# Patient Record
Sex: FEMALE | Race: WHITE | NOT HISPANIC OR LATINO | Employment: FULL TIME | URBAN - METROPOLITAN AREA
[De-identification: names, ages, dates, MRNs, and addresses within clinical notes are randomized per-mention and may not be internally consistent; named-entity substitution may affect disease eponyms.]

---

## 2017-05-27 ENCOUNTER — COMMUNICATION - HEALTHEAST (OUTPATIENT)
Dept: SCHEDULING | Facility: CLINIC | Age: 37
End: 2017-05-27

## 2017-06-08 ENCOUNTER — OFFICE VISIT - HEALTHEAST (OUTPATIENT)
Dept: FAMILY MEDICINE | Facility: CLINIC | Age: 37
End: 2017-06-08

## 2017-06-08 DIAGNOSIS — B00.89 HERPETIC WHITLOW: ICD-10-CM

## 2017-06-08 DIAGNOSIS — E66.9 OBESITY (BMI 30-39.9): ICD-10-CM

## 2017-06-08 DIAGNOSIS — L03.90 CELLULITIS: ICD-10-CM

## 2017-06-08 ASSESSMENT — MIFFLIN-ST. JEOR: SCORE: 1592.82

## 2017-07-21 ENCOUNTER — OFFICE VISIT - HEALTHEAST (OUTPATIENT)
Dept: FAMILY MEDICINE | Facility: CLINIC | Age: 37
End: 2017-07-21

## 2017-07-21 ENCOUNTER — COMMUNICATION - HEALTHEAST (OUTPATIENT)
Dept: FAMILY MEDICINE | Facility: CLINIC | Age: 37
End: 2017-07-21

## 2017-07-21 ENCOUNTER — RECORDS - HEALTHEAST (OUTPATIENT)
Dept: GENERAL RADIOLOGY | Facility: CLINIC | Age: 37
End: 2017-07-21

## 2017-07-21 DIAGNOSIS — M79.671 PAIN IN RIGHT FOOT: ICD-10-CM

## 2017-07-21 DIAGNOSIS — M79.671 RIGHT FOOT PAIN: ICD-10-CM

## 2017-07-26 ENCOUNTER — HOSPITAL ENCOUNTER (OUTPATIENT)
Dept: MRI IMAGING | Facility: CLINIC | Age: 37
Discharge: HOME | End: 2017-07-26
Attending: FAMILY MEDICINE

## 2017-07-26 DIAGNOSIS — M79.671 RIGHT FOOT PAIN: ICD-10-CM

## 2017-08-03 ENCOUNTER — COMMUNICATION - HEALTHEAST (OUTPATIENT)
Dept: FAMILY MEDICINE | Facility: CLINIC | Age: 37
End: 2017-08-03

## 2017-08-09 ENCOUNTER — COMMUNICATION - HEALTHEAST (OUTPATIENT)
Dept: FAMILY MEDICINE | Facility: CLINIC | Age: 37
End: 2017-08-09

## 2017-12-31 ENCOUNTER — HEALTH MAINTENANCE LETTER (OUTPATIENT)
Age: 37
End: 2017-12-31

## 2019-10-21 ENCOUNTER — COMMUNICATION - HEALTHEAST (OUTPATIENT)
Dept: FAMILY MEDICINE | Facility: CLINIC | Age: 39
End: 2019-10-21

## 2019-10-21 DIAGNOSIS — Z71.84 TRAVEL ADVICE ENCOUNTER: ICD-10-CM

## 2021-05-31 VITALS — BODY MASS INDEX: 38.34 KG/M2 | WEIGHT: 213 LBS

## 2021-05-31 VITALS — BODY MASS INDEX: 37.56 KG/M2 | HEIGHT: 63 IN | WEIGHT: 212 LBS

## 2021-06-02 NOTE — TELEPHONE ENCOUNTER
RN cannot approve Refill Request    RN can NOT refill this medication med is not covered by policy/route to provider. Last office visit: 2/15/2016 Larissa Lee MD Last Physical: Visit date not found Last MTM visit: Visit date not found Last visit same specialty: 7/21/2017 Devyn Barajas MD.  Next visit within 3 mo: Visit date not found  Next physical within 3 mo: Visit date not found      Flaca Brown, [facility] Connection Triage/Med Refill 10/21/2019    Requested Prescriptions   Pending Prescriptions Disp Refills     atovaquone-proguanil (MALARONE) 250-100 mg Tab [Pharmacy Med Name: ATOVAQ/PROGUANIL 250-100 TAB] 20 tablet 0     Sig: TAKE ONE TABLET BY MOUTH ONCE DAILY ,START  2  DAYS  PRIOR  TO  TRAVEL  AND  TAKE  DAILY  THROUGH  7  DAYS  POST-TRAVEL       There is no refill protocol information for this order

## 2021-06-11 NOTE — PROGRESS NOTES
[facility] wknd bug bite to cellulitis rx antibx recent    Last weekend. New problem. Foot itch.  Left 1-2 toes swell and itch.  Swelled between.  prox streaking.  Getting better.  No tenderness or fever    Yesterday right hip erythematous warm and itch.    No fever  Nl stool  Nl urine  Joint joint pain  No chills    otherwise healthy.  No past hx illness.    Hx sports related injuries.  Aggressive     ROS: as noted above    OBJECTIVE:   Vitals:    06/08/17 1543   BP: 110/72   Pulse: 68   Resp: 20   Temp: 97.8  F (36.6  C)      Eyes: non icteric, noninflamed  Lungs: no resp distress  Heart: regular  Ankles: no edema  Muscles: nontender  Mental status: euthymic  Neuro: nonfocal    Right iliac crest area plaque erythema 8 cm.  A smaller area of darker red within larger area.  The inner area does not cecy.  Minimal induration.  Nontender.  No streaking    Left foot. Toes 1-2 mild erythema and healing ?herpetic on second toe.  Proximal streak medial foot.  This is non tender.  No associated adenopathy      bmi 38    ASSESSMENT/PLAN:  Antibiotic for ? Infectious  tmc topical to pruritic areas.  Sparing  Anticipate resolution otherwise return.  Return sooner if symptoms worsen.  More than 10 of fifteen total minutes time spent education counseling regarding the issues and care of same as listed in the assessment and plan of this note      Additional diagnoses and related orders:  1. Cellulitis  sulfamethoxazole-trimethoprim (SEPTRA DS) 800-160 mg per tablet    triamcinolone (KENALOG) 0.1 % ointment   2. Herpetic ashutosh     3. Obesity (BMI 30-39.9)

## 2021-06-12 NOTE — PROGRESS NOTES
Three wks ago fell down stairs.  Right foot.  Had xrays neg.  Urgent care yariel.  Said would be fine in few days.  Not.       Yesterday walked quite a bit and it throbbed a lot.  Pain moves.  Points to mid first metatarsal and arch and anterior ankle.  Heel  And top of foot.   Helped by massage.    If gets up, it hurts most first step.  Then tolerable until walk more, then done.  It gets worse than the first step.    Wants mri.    Work for [employer] job    ROS: as noted above    OBJECTIVE:   Vitals:    07/21/17 1018   BP: 112/70   Pulse: 64   Resp: 24      Eyes: non icteric, noninflamed  Lungs: no resp distress  Heart: regular  Ankles: no edema  Muscles: nontender  Mental status: euthymic  Neuro: nonfocal  Foot and ankle appear nl  No edema.  No laxity of ankle.    Gait with apparent mild pain on weight bearing  Xray ordered and view personally:   ? Abnormality of medial and intermediate cuneiforms    ASSESSMENT/PLAN:    1. Right foot pain  MR Foot Without Contrast Right    XR Foot Right 3 or More VWS     Will await radiology report.  If neg. Then mri.  If positive then to orth

## 2021-06-16 PROBLEM — E66.9 OBESITY (BMI 30-39.9): Status: ACTIVE | Noted: 2017-06-08

## 2021-06-20 ENCOUNTER — APPOINTMENT (OUTPATIENT)
Dept: ULTRASOUND IMAGING | Facility: CLINIC | Age: 41
End: 2021-06-20
Attending: EMERGENCY MEDICINE
Payer: COMMERCIAL

## 2021-06-20 ENCOUNTER — HOSPITAL ENCOUNTER (OUTPATIENT)
Facility: CLINIC | Age: 41
Setting detail: OBSERVATION
Discharge: HOME | End: 2021-06-21
Attending: EMERGENCY MEDICINE | Admitting: INTERNAL MEDICINE
Payer: COMMERCIAL

## 2021-06-20 DIAGNOSIS — K81.0 ACUTE CHOLECYSTITIS: Primary | ICD-10-CM

## 2021-06-20 DIAGNOSIS — K81.0 ACUTE CHOLECYSTITIS: ICD-10-CM

## 2021-06-20 DIAGNOSIS — K80.50 BILIARY COLIC: ICD-10-CM

## 2021-06-20 LAB
ALBUMIN SERPL-MCNC: 3.7 G/DL (ref 3.4–5)
ALP SERPL-CCNC: 117 U/L (ref 40–150)
ALT SERPL W P-5'-P-CCNC: 36 U/L (ref 0–50)
ANION GAP SERPL CALCULATED.3IONS-SCNC: 6 MMOL/L (ref 3–14)
AST SERPL W P-5'-P-CCNC: 15 U/L (ref 0–45)
BASOPHILS # BLD AUTO: 0.1 10E9/L (ref 0–0.2)
BASOPHILS NFR BLD AUTO: 0.4 %
BILIRUB SERPL-MCNC: 0.8 MG/DL (ref 0.2–1.3)
BUN SERPL-MCNC: 4 MG/DL (ref 7–30)
CALCIUM SERPL-MCNC: 9 MG/DL (ref 8.5–10.1)
CHLORIDE SERPL-SCNC: 104 MMOL/L (ref 94–109)
CO2 SERPL-SCNC: 28 MMOL/L (ref 20–32)
CREAT SERPL-MCNC: 0.88 MG/DL (ref 0.52–1.04)
DIFFERENTIAL METHOD BLD: ABNORMAL
EOSINOPHIL # BLD AUTO: 0.1 10E9/L (ref 0–0.7)
EOSINOPHIL NFR BLD AUTO: 0.4 %
ERYTHROCYTE [DISTWIDTH] IN BLOOD BY AUTOMATED COUNT: 11.9 % (ref 10–15)
GFR SERPL CREATININE-BSD FRML MDRD: 82 ML/MIN/{1.73_M2}
GLUCOSE SERPL-MCNC: 101 MG/DL (ref 70–99)
HCT VFR BLD AUTO: 44.4 % (ref 35–47)
HGB BLD-MCNC: 14.8 G/DL (ref 11.7–15.7)
IMM GRANULOCYTES # BLD: 0.1 10E9/L (ref 0–0.4)
IMM GRANULOCYTES NFR BLD: 0.5 %
LABORATORY COMMENT REPORT: NORMAL
LIPASE SERPL-CCNC: 53 U/L (ref 73–393)
LYMPHOCYTES # BLD AUTO: 2.7 10E9/L (ref 0.8–5.3)
LYMPHOCYTES NFR BLD AUTO: 17.7 %
MCH RBC QN AUTO: 28.6 PG (ref 26.5–33)
MCHC RBC AUTO-ENTMCNC: 33.3 G/DL (ref 31.5–36.5)
MCV RBC AUTO: 86 FL (ref 78–100)
MONOCYTES # BLD AUTO: 1.4 10E9/L (ref 0–1.3)
MONOCYTES NFR BLD AUTO: 9.4 %
NEUTROPHILS # BLD AUTO: 10.8 10E9/L (ref 1.6–8.3)
NEUTROPHILS NFR BLD AUTO: 71.6 %
NRBC # BLD AUTO: 0 10*3/UL
NRBC BLD AUTO-RTO: 0 /100
PLATELET # BLD AUTO: 392 10E9/L (ref 150–450)
POTASSIUM SERPL-SCNC: 3.6 MMOL/L (ref 3.4–5.3)
PROT SERPL-MCNC: 8.3 G/DL (ref 6.8–8.8)
RBC # BLD AUTO: 5.18 10E12/L (ref 3.8–5.2)
SARS-COV-2 RNA RESP QL NAA+PROBE: NEGATIVE
SODIUM SERPL-SCNC: 138 MMOL/L (ref 133–144)
SPECIMEN SOURCE: NORMAL
WBC # BLD AUTO: 15.1 10E9/L (ref 4–11)

## 2021-06-20 PROCEDURE — 87635 SARS-COV-2 COVID-19 AMP PRB: CPT | Performed by: EMERGENCY MEDICINE

## 2021-06-20 PROCEDURE — 99220 PR INITIAL OBSERVATION CARE,LEVEL III: CPT | Performed by: PHYSICIAN ASSISTANT

## 2021-06-20 PROCEDURE — 250N000011 HC RX IP 250 OP 636: Performed by: PHYSICIAN ASSISTANT

## 2021-06-20 PROCEDURE — 99285 EMERGENCY DEPT VISIT HI MDM: CPT | Mod: 25

## 2021-06-20 PROCEDURE — 80053 COMPREHEN METABOLIC PANEL: CPT | Performed by: EMERGENCY MEDICINE

## 2021-06-20 PROCEDURE — C9803 HOPD COVID-19 SPEC COLLECT: HCPCS

## 2021-06-20 PROCEDURE — G0378 HOSPITAL OBSERVATION PER HR: HCPCS

## 2021-06-20 PROCEDURE — 258N000003 HC RX IP 258 OP 636: Performed by: EMERGENCY MEDICINE

## 2021-06-20 PROCEDURE — 250N000011 HC RX IP 250 OP 636: Performed by: EMERGENCY MEDICINE

## 2021-06-20 PROCEDURE — 96365 THER/PROPH/DIAG IV INF INIT: CPT

## 2021-06-20 PROCEDURE — 96375 TX/PRO/DX INJ NEW DRUG ADDON: CPT

## 2021-06-20 PROCEDURE — 76705 ECHO EXAM OF ABDOMEN: CPT

## 2021-06-20 PROCEDURE — 85025 COMPLETE CBC W/AUTO DIFF WBC: CPT | Performed by: EMERGENCY MEDICINE

## 2021-06-20 PROCEDURE — 96376 TX/PRO/DX INJ SAME DRUG ADON: CPT

## 2021-06-20 PROCEDURE — 83690 ASSAY OF LIPASE: CPT | Performed by: EMERGENCY MEDICINE

## 2021-06-20 RX ORDER — NALOXONE HYDROCHLORIDE 0.4 MG/ML
0.2 INJECTION, SOLUTION INTRAMUSCULAR; INTRAVENOUS; SUBCUTANEOUS
Status: DISCONTINUED | OUTPATIENT
Start: 2021-06-20 | End: 2021-06-21 | Stop reason: HOSPADM

## 2021-06-20 RX ORDER — HYDROMORPHONE HYDROCHLORIDE 1 MG/ML
.3-.5 INJECTION, SOLUTION INTRAMUSCULAR; INTRAVENOUS; SUBCUTANEOUS
Status: DISCONTINUED | OUTPATIENT
Start: 2021-06-20 | End: 2021-06-21 | Stop reason: HOSPADM

## 2021-06-20 RX ORDER — ACETAMINOPHEN 650 MG/1
650 SUPPOSITORY RECTAL EVERY 4 HOURS PRN
Status: DISCONTINUED | OUTPATIENT
Start: 2021-06-20 | End: 2021-06-21 | Stop reason: HOSPADM

## 2021-06-20 RX ORDER — NALOXONE HYDROCHLORIDE 0.4 MG/ML
0.4 INJECTION, SOLUTION INTRAMUSCULAR; INTRAVENOUS; SUBCUTANEOUS
Status: DISCONTINUED | OUTPATIENT
Start: 2021-06-20 | End: 2021-06-21 | Stop reason: HOSPADM

## 2021-06-20 RX ORDER — KETOROLAC TROMETHAMINE 15 MG/ML
15 INJECTION, SOLUTION INTRAMUSCULAR; INTRAVENOUS EVERY 6 HOURS PRN
Status: DISCONTINUED | OUTPATIENT
Start: 2021-06-20 | End: 2021-06-21 | Stop reason: HOSPADM

## 2021-06-20 RX ORDER — ACETAMINOPHEN 325 MG/1
650 TABLET ORAL EVERY 4 HOURS PRN
Status: DISCONTINUED | OUTPATIENT
Start: 2021-06-20 | End: 2021-06-21 | Stop reason: HOSPADM

## 2021-06-20 RX ORDER — PROCHLORPERAZINE 25 MG
25 SUPPOSITORY, RECTAL RECTAL EVERY 12 HOURS PRN
Status: DISCONTINUED | OUTPATIENT
Start: 2021-06-20 | End: 2021-06-21 | Stop reason: HOSPADM

## 2021-06-20 RX ORDER — OXYCODONE HYDROCHLORIDE 5 MG/1
5-10 TABLET ORAL
Status: DISCONTINUED | OUTPATIENT
Start: 2021-06-20 | End: 2021-06-21 | Stop reason: HOSPADM

## 2021-06-20 RX ORDER — MORPHINE SULFATE 4 MG/ML
4 INJECTION, SOLUTION INTRAMUSCULAR; INTRAVENOUS ONCE
Status: COMPLETED | OUTPATIENT
Start: 2021-06-20 | End: 2021-06-20

## 2021-06-20 RX ORDER — SODIUM CHLORIDE AND POTASSIUM CHLORIDE 150; 900 MG/100ML; MG/100ML
INJECTION, SOLUTION INTRAVENOUS CONTINUOUS
Status: DISCONTINUED | OUTPATIENT
Start: 2021-06-20 | End: 2021-06-21 | Stop reason: HOSPADM

## 2021-06-20 RX ORDER — HYDROMORPHONE HYDROCHLORIDE 1 MG/ML
0.5 INJECTION, SOLUTION INTRAMUSCULAR; INTRAVENOUS; SUBCUTANEOUS ONCE
Status: COMPLETED | OUTPATIENT
Start: 2021-06-20 | End: 2021-06-20

## 2021-06-20 RX ORDER — ONDANSETRON 4 MG/1
4 TABLET, ORALLY DISINTEGRATING ORAL EVERY 6 HOURS PRN
Status: DISCONTINUED | OUTPATIENT
Start: 2021-06-20 | End: 2021-06-21 | Stop reason: HOSPADM

## 2021-06-20 RX ORDER — PROCHLORPERAZINE MALEATE 10 MG
10 TABLET ORAL EVERY 6 HOURS PRN
Status: DISCONTINUED | OUTPATIENT
Start: 2021-06-20 | End: 2021-06-21 | Stop reason: HOSPADM

## 2021-06-20 RX ORDER — ONDANSETRON 2 MG/ML
4 INJECTION INTRAMUSCULAR; INTRAVENOUS EVERY 6 HOURS PRN
Status: DISCONTINUED | OUTPATIENT
Start: 2021-06-20 | End: 2021-06-21 | Stop reason: HOSPADM

## 2021-06-20 RX ORDER — LANOLIN ALCOHOL/MO/W.PET/CERES
3 CREAM (GRAM) TOPICAL
Status: DISCONTINUED | OUTPATIENT
Start: 2021-06-20 | End: 2021-06-21 | Stop reason: HOSPADM

## 2021-06-20 RX ORDER — ONDANSETRON 2 MG/ML
4 INJECTION INTRAMUSCULAR; INTRAVENOUS ONCE
Status: COMPLETED | OUTPATIENT
Start: 2021-06-20 | End: 2021-06-20

## 2021-06-20 RX ORDER — BISACODYL 10 MG
10 SUPPOSITORY, RECTAL RECTAL DAILY PRN
Status: DISCONTINUED | OUTPATIENT
Start: 2021-06-20 | End: 2021-06-21 | Stop reason: HOSPADM

## 2021-06-20 RX ORDER — AMOXICILLIN 250 MG
1 CAPSULE ORAL 2 TIMES DAILY PRN
Status: DISCONTINUED | OUTPATIENT
Start: 2021-06-20 | End: 2021-06-21 | Stop reason: HOSPADM

## 2021-06-20 RX ORDER — AMOXICILLIN 250 MG
2 CAPSULE ORAL 2 TIMES DAILY PRN
Status: DISCONTINUED | OUTPATIENT
Start: 2021-06-20 | End: 2021-06-21 | Stop reason: HOSPADM

## 2021-06-20 RX ADMIN — TAZOBACTAM SODIUM AND PIPERACILLIN SODIUM 4.5 G: 500; 4 INJECTION, SOLUTION INTRAVENOUS at 14:45

## 2021-06-20 RX ADMIN — HYDROMORPHONE HYDROCHLORIDE 0.5 MG: 1 INJECTION, SOLUTION INTRAMUSCULAR; INTRAVENOUS; SUBCUTANEOUS at 15:49

## 2021-06-20 RX ADMIN — HYDROMORPHONE HYDROCHLORIDE 0.5 MG: 1 INJECTION, SOLUTION INTRAMUSCULAR; INTRAVENOUS; SUBCUTANEOUS at 13:46

## 2021-06-20 RX ADMIN — SODIUM CHLORIDE 1000 ML: 9 INJECTION, SOLUTION INTRAVENOUS at 12:00

## 2021-06-20 RX ADMIN — TAZOBACTAM SODIUM AND PIPERACILLIN SODIUM 3.38 G: 375; 3 INJECTION, SOLUTION INTRAVENOUS at 20:31

## 2021-06-20 RX ADMIN — POTASSIUM CHLORIDE AND SODIUM CHLORIDE 100 ML/HR: 900; 150 INJECTION, SOLUTION INTRAVENOUS at 16:02

## 2021-06-20 RX ADMIN — MORPHINE SULFATE 4 MG: 4 INJECTION INTRAVENOUS at 12:00

## 2021-06-20 RX ADMIN — HYDROMORPHONE HYDROCHLORIDE 0.5 MG: 1 INJECTION, SOLUTION INTRAMUSCULAR; INTRAVENOUS; SUBCUTANEOUS at 22:21

## 2021-06-20 RX ADMIN — ONDANSETRON 4 MG: 2 INJECTION INTRAMUSCULAR; INTRAVENOUS at 12:00

## 2021-06-20 ASSESSMENT — ENCOUNTER SYMPTOMS
DIAPHORESIS: 0
ABDOMINAL PAIN: 1
BACK PAIN: 1
FEVER: 0
NAUSEA: 0
VOMITING: 0
COLOR CHANGE: 0

## 2021-06-20 ASSESSMENT — MIFFLIN-ST. JEOR: SCORE: 1655.19

## 2021-06-20 NOTE — ED TRIAGE NOTES
A&O x4.  ABC's intact.      Pt arrives with c/o right upper abdominal pain that started Tuesday evening. Has vomited. Denies abdominal surgeries, or urinary symptoms.  Last BM Friday. Hasn't eaten since Thursday.

## 2021-06-20 NOTE — PHARMACY-ADMISSION MEDICATION HISTORY
Admission medication history interview status for this patient is complete. See [facility] admission navigator for allergy information, prior to admission medications and immunization status.     Medication history interview done, indicate source(s): Patient  Medication history resources (including written lists, pill bottles, clinic record):[facility] list  Pharmacy: [facility], [place]    Changes made to PTA medication list:  Added: -  Deleted: -  Changed: -    Actions taken by pharmacist (provider contacted, etc):None     Additional medication history information:None    Medication reconciliation/reorder completed by provider prior to medication history?  N   (Y/N)       Prior to Admission medications    Medication Sig Last Dose Taking? Auth Provider   norgestimate-ethinyl estradiol (ORTHO-CYCLEN, SPRINTEC) 0.25-35 MG-MCG per tablet Take 1 tablet by mouth daily   Reported, Patient

## 2021-06-20 NOTE — ED NOTES
[facility]  ED Nurse Handoff Report    Sierra Tovar is a 41 year old female   ED Chief complaint: Abdominal Pain  . ED Diagnosis:   Final diagnoses:   Biliary colic     Allergies:   Allergies   Allergen Reactions     Metronidazole Unknown       Code Status: Full Code  Activity level - Baseline/Home:  Independent. Activity Level - Current:   Stand by Assist. Lift room needed: No. Bariatric: No   Needed: No   Isolation: No. Infection: Not Applicable.     Vital Signs:   Vitals:    06/20/21 1230 06/20/21 1245 06/20/21 1300 06/20/21 1330   BP: 123/80 137/69 127/76 (!) 145/78   Pulse: 85 65 70    Resp:       Temp:       TempSrc:       SpO2:    96%       Cardiac Rhythm:  ,      Pain level:    Patient confused: No. Patient Falls Risk: Yes.   Elimination Status: Has voided   Patient Report - Initial Complaint: abd pain. Focused Assessment: Gastrointestinal - Gastrointestinal WDL: all  Abdominal Palpation: RUQ  GI Signs/Symptoms: abdominal discomfort; vomiting (emesis X2 the past few days)  Tests Performed: lab/imaging. Abnormal Results:   Labs Ordered and Resulted from Time of ED Arrival Up to the Time of Departure from the ED   CBC WITH PLATELETS DIFFERENTIAL - Abnormal; Notable for the following components:       Result Value    WBC 15.1 (*)     Absolute Neutrophil 10.8 (*)     Absolute Monocytes 1.4 (*)     All other components within normal limits   COMPREHENSIVE METABOLIC PANEL - Abnormal; Notable for the following components:    Glucose 101 (*)     Urea Nitrogen 4 (*)     All other components within normal limits   LIPASE - Abnormal; Notable for the following components:    Lipase 53 (*)     All other components within normal limits   SARS-COV-2 (COVID-19) VIRUS RT-PCR   PERIPHERAL IV CATHETER     .   Treatments provided: MAR  Family Comments: spouse present  OBS brochure/video discussed/provided to patient:  Yes  ED Medications:   Medications   piperacillin-tazobactam (ZOSYN) intermittent  infusion 4.5 g (has no administration in time range)   morphine (PF) injection 4 mg (4 mg Intravenous Given 6/20/21 1200)   ondansetron (ZOFRAN) injection 4 mg (4 mg Intravenous Given 6/20/21 1200)   0.9% sodium chloride BOLUS (1,000 mLs Intravenous New Bag 6/20/21 1200)   HYDROmorphone (PF) (DILAUDID) injection 0.5 mg (0.5 mg Intravenous Given 6/20/21 1346)     Drips infusing:  No  For the majority of the shift, the patient's behavior Green. Interventions performed were .    Sepsis treatment initiated: No     Patient tested for COVID 19 prior to admission: YES    ED Nurse Name/Phone Number: Isabel Harmon RN,   2:34 PM    RECEIVING UNIT ED HANDOFF REVIEW    Above ED Nurse Handoff Report was reviewed: Yes  Reviewed by: Karen M. Lesch, RN on June 20, 2021 at 3:18 PM

## 2021-06-20 NOTE — PROGRESS NOTES
ROOM # 202    Living Situation (if not independent, order SW consult): home with   Facility name: NA  :     Activity level at baseline: indep  Activity level on admit: indep      Patient registered to observation; given Patient Bill of Rights; given the opportunity to ask questions about observation status and their plan of care.  Patient has been oriented to the observation room, bathroom and call light is in place.    Discussed discharge goals and expectations with patient/family.

## 2021-06-20 NOTE — H&P
[facility]  History and Physical  Hospitalist       Date of Admission:  6/20/2021    Assessment & Plan   Sierra Tovar is a 41 year old female with no medical problems who presented to [facility] ED with RUQ abdominal pain, nausea, and vomiting worsening since Tuesday.  CMP within normal limits.  CBC notable for WBC 15.1 with left shift.  RUQ U/S revealed cholelithiasis, GB wall thickening, and positive Garcia's sign without biliary dilatation suggesting acute cholecystitis.  General Surgery contacted and are unable to take patient to OR today due to OR availability.  Obs admission requested.       Acute cholecystitis with cholelithiasis  Continue Zosyn.  Antiemetics.  Pain control with PRN APAP, toradol, oxy, or dilaudid.  General Surgery consult.  Low fat diet, NPO at midnight in anticipation of OR tomorrow.  Repeat CMP and CBC in AM.     COVID status:  Negative.  DVT Prophylaxis: Ambulate every shift  Code Status:  Full     Disposition: Expected discharge in tomorrow after lap rashid.     Charmaine Castillo, PAC  Hospitalist Service  [facility]  Text Page (7AM - 5PM, M-F)      PRIMARY CARE PROVIDER: Physician No Ref-Primary    CHIEF COMPLAINT  RUQ pain, N/V    History is obtained from the patient    HISTORY OF PRESENT ILLNESS  Seirra Tovar is a 41 year old female with no medical problems who presented to [facility] ED with RUQ abdominal pain, nausea, and vomiting that began Tuesday.  Symptoms have been getting worse with time.  She hasn't been able to eat since Thursday.  Pain is mainly in the RUQ but also in the back.  Denies fever, chills, shortness of breath, chest pain, diarrhea.      In the ED, /134, , RR 20, SpO2 98% on RA, temp 97.3.  CMP within normal limits.  CBC notable for WBC 15.1 with left shift.  RUQ U/S revealed cholelithiasis, GB wall thickening, and positive Garcia's sign without biliary dilatation suggesting acute cholecystitis.  General Surgery  contacted and are unable to take patient to OR today due to OR availability.  Patient received fluids, zofran, IV morphine, IV dilaudid, and started on Zosyn.  Pain improved and patient now reporting an appetite.  Obs admission requested.        PAST MEDICAL HISTORY  1. HSV2  2. Ankle fracture  3. Plantar fasciitis    PAST SURGICAL HISTORY  Lasik.     PRIOR TO ADMISSION MEDICATIONS  Prior to Admission Medications   Prescriptions Last Dose Informant Patient Reported? Taking?   norgestimate-ethinyl estradiol (ORTHO-CYCLEN, SPRINTEC) 0.25-35 MG-MCG per tablet   Yes No   Sig: Take 1 tablet by mouth daily      Facility-Administered Medications: None       ALLERGIES  Allergies   Allergen Reactions     Metronidazole Unknown       SOCIAL HISTORY  . No tobacco use. Occasional alcohol use.  IADL.    FAMILY HISTORY  Birth father had conduction disease requiring PPM.  Maternal grandfather had DM and heart disease.  No family history of gallbladder disease.    REVIEW OF SYSTEMS:  14 point comprehensive ROS undertaken with pertinent positives and negatives as above and otherwise unremarkable.     PHYSICAL EXAM  Temp: 97.3  F (36.3  C) Temp src: Temporal BP: (!) 145/78 Pulse: 70   Resp: 20 SpO2: 96 % O2 Device: None (Room air)    Vital Signs with Ranges  Temp:  [97.3  F (36.3  C)] 97.3  F (36.3  C)  Pulse:  [] 70  Resp:  [20] 20  BP: (123-202)/() 145/78  SpO2:  [96 %-98 %] 96 %  0 lbs 0 oz    GENERAL:  Pleasant, cooperative, alert. Well developed, well nourished.    HEENT: Normocephalic, atraumatic.  Extra occular mm intact.  Sclera clear. PER.  Mucous membranes moist.     PULMONARY: Clear to auscultation bilaterally.  CARDIAC: Regular rate and rhythm + guarding  ABDOMEN: Soft, TTP RUQ  MUSCULOSKELETAL:  Moving x 4 spontaneously with CMS intact x4.  Normal bulk and tone.    NEURO: Alert and oriented x3.  CN II-XII grossly intact and symmetric.  No ataxia or tremor.  Nonfocal.  SKIN:  Warm, pink,  dry.    DATA  Data reviewed today:  I personally reviewed no images or EKG's today.    Recent Labs   Lab 06/20/21  1202   WBC 15.1*   HGB 14.8   MCV 86         POTASSIUM 3.6   CHLORIDE 104   CO2 28   BUN 4*   CR 0.88   ANIONGAP 6   TIANA 9.0   *   ALBUMIN 3.7   PROTTOTAL 8.3   BILITOTAL 0.8   ALKPHOS 117   ALT 36   AST 15   LIPASE 53*       Recent Results (from the past 24 hour(s))   Abdomen US, limited (RUQ only)    Narrative    US ABDOMEN LIMITED 6/20/2021 1:28 PM    CLINICAL HISTORY: Right upper quadrant pain.    TECHNIQUE: Limited abdominal ultrasound.    COMPARISON: None.    FINDINGS:    GALLBLADDER: There are shadowing gallstones in the gallbladder. There  is mild gallbladder wall thickening measuring 4 mm. Patient reports  tenderness with direct transducer pressure over the gallbladder.    BILE DUCTS: There is no biliary dilatation. The common duct measures  3mm.    LIVER: Normal where seen.    RIGHT KIDNEY: No hydronephrosis.    PANCREAS: The visualized portions of the pancreas are normal.    No ascites.      Impression    IMPRESSION:  1.  Cholelithiasis, gallbladder wall thickening, and positive  sonographic Garcia's sign compatible with acute cholecystitis.    TIP BROWN MD

## 2021-06-20 NOTE — ED PROVIDER NOTES
History   Chief Complaint:  Abdominal Pain     The history is provided by the patient.      Sierra Tovar is a 41 year old female who presents with abdominal pain. Five days ago, she developed RUQ abdominal pain. The next day, she had one episode of emesis and was fatigued. Her pain was intermittent at this time. Three days ago, her pain became constant. She is also experiencing associated intermittent back pain and diaphoresis. She states that her pain is improved when there is pressure over the area. She has had similar episodes in the past but her current symptoms are more persistent. She has never been seen in clinic for these symptoms. She denies any injuries or trauma to the area and has not eaten any new foods. She is not nauseated or jaundiced. No one else is sick ay home.     Review of Systems   Constitutional: Negative for diaphoresis and fever.   Gastrointestinal: Positive for abdominal pain. Negative for nausea and vomiting.   Musculoskeletal: Positive for back pain.   Skin: Negative for color change.   All other systems reviewed and are negative.      Allergies:  Metronidazole     Medications:  Ortho-cyclen     Past Medical History:    Herpes   Closed fracture of lateral malleolus      Past Surgical History:    Patient denies past surgical history.     Family History:    Heart disease     Social History:    Accompanied by spouse  Denies alcohol use    Physical Exam     Patient Vitals for the past 24 hrs:   BP Temp Temp src Pulse Resp SpO2   06/20/21 1330 (!) 145/78 -- -- -- -- 96 %   06/20/21 1300 127/76 -- -- 70 -- --   06/20/21 1245 137/69 -- -- 65 -- --   06/20/21 1230 123/80 -- -- 85 -- --   06/20/21 1215 134/83 -- -- 78 -- --   06/20/21 1200 137/84 -- -- -- -- --   06/20/21 1033 (!) 202/134 97.3  F (36.3  C) Temporal 105 20 98 %     Physical Exam  Constitutional: Vital signs reviewed as above.   HENT:    Head: No external signs of trauma noted.   Eyes: Conjunctivae are normal. Pupils  are equal, round, and reactive to light. No scleral icterus noted.  Cardiovascular:    Normal rate, regular rhythm and normal heart sounds.     Exam reveals no friction rub.     No murmur heard.  Pulmonary/Chest:    Effort normal and breath sounds normal.    No respiratory distress.    There are no wheezes.    There are no rales.   Gastrointestinal:    Soft.    Bowel sounds normal.    There is no distension.    There is RUQ tenderness.    There is no rebound or guarding.   Musculoskeletal:    Normal range of motion.    Normal Tone  Neurological: Patient is alert and oriented to person, place, and time.   Skin: Skin is warm and dry. Patient is not diaphoretic. No jaundice appreciated.  Psychiatric: The patient appears calm    Emergency Department Course    Imaging:  US Abdomen limited RUQ  IMPRESSION:   1.  Cholelithiasis, gallbladder wall thickening, and positive   sonographic Garcia's sign compatible with acute cholecystitis  Reading per radiology    Laboratory:  CBC: WBC 15.1 (H), HGB 14.8,    CMP: glucose 101 (H), BUN 4 (L) o/w WNL (Creatinine 0.88)     Lipase: 53 (L)    Asymptomatic COVID19 Virus PCR by nasopharyngeal swab pending    Emergency Department Course:  Reviewed:  I reviewed nursing notes, vitals, past medical history and care everywhere    Assessments/consults:  ED Course as of Jun 20 1409   Sun Jun 20, 2021   1105 I obtained history and examined the patient as noted above.       1334 I rechecked the patient and explained findings.       1359 I spoke to Dr. Lorenzo of general surgery regarding this patient.       1359 D/W Dr. Lorenzo - no OR capacity right now for patient.      1407 D/W Charmaine Castillo PA-C, accepting for Dr. Caceres. Asks for Zosyn.            Interventions:  Medications   piperacillin-tazobactam (ZOSYN) intermittent infusion 4.5 g (has no administration in time range)   morphine (PF) injection 4 mg (4 mg Intravenous Given 6/20/21 1200)   ondansetron (ZOFRAN) injection 4 mg (4 mg  Intravenous Given 6/20/21 1200)   0.9% sodium chloride BOLUS (1,000 mLs Intravenous New Bag 6/20/21 1200)   HYDROmorphone (PF) (DILAUDID) injection 0.5 mg (0.5 mg Intravenous Given 6/20/21 1346)         Disposition:  The patient was admitted to the hospital under the care of Dr. Caceres.     Impression & Plan     CMS Diagnoses: None    Medical Decision Making:  This 41-year-old female patient presents to the ED due to abdominal pain.  Please see the HPI and exam for specifics.  A broad differential was considered though given the patient's history and physical exam, biliary disease was thought to be the most likely etiology.  Laboratory studies and ultrasound imaging were ordered with results as above.  Significant findings include gallstones with borderline wall thickening and right upper quadrant tenderness. The patient does not have a fever nor LFT elevation. I think her symptoms are most consistent with biliary colic though certainly early cholecystitis cannot be ruled out. She had return of and worsening of pain so I think that observation is a reasonable next step with potential general surgery consultation. We will give the patient a dose of antibiotics down here and have her continually monitored in the observation unit.    Covid-19  Sierra Tovar was evaluated during a global COVID-19 pandemic, which necessitated consideration that the patient might be at risk for infection with the SARS-CoV-2 virus that causes COVID-19.   Applicable protocols for evaluation were followed during the patient's care.   COVID-19 was considered as part of the patient's evaluation. The plan for testing is:  a test was obtained during this visit.    Diagnosis:    ICD-10-CM    1. Biliary colic  K80.50        Discharge Medications:  New Prescriptions    No medications on file       Scribe Disclosure:  Marilee NUÑEZ, am serving as a scribe at 10:39 AM on 6/20/2021 to document services personally performed by Jeferson Galloway  DO Ch[place] based on my observations and the provider's statements to me.              Jeferson Galloway DO  06/20/21 8205

## 2021-06-21 ENCOUNTER — ANESTHESIA (OUTPATIENT)
Dept: SURGERY | Facility: CLINIC | Age: 41
End: 2021-06-21
Payer: COMMERCIAL

## 2021-06-21 ENCOUNTER — ANESTHESIA EVENT (OUTPATIENT)
Dept: SURGERY | Facility: CLINIC | Age: 41
End: 2021-06-21
Payer: COMMERCIAL

## 2021-06-21 ENCOUNTER — APPOINTMENT (OUTPATIENT)
Dept: SURGERY | Facility: PHYSICIAN GROUP | Age: 41
End: 2021-06-21
Payer: COMMERCIAL

## 2021-06-21 VITALS
DIASTOLIC BLOOD PRESSURE: 81 MMHG | OXYGEN SATURATION: 95 % | TEMPERATURE: 98.6 F | SYSTOLIC BLOOD PRESSURE: 125 MMHG | HEIGHT: 63 IN | HEART RATE: 75 BPM | BODY MASS INDEX: 40.45 KG/M2 | RESPIRATION RATE: 15 BRPM | WEIGHT: 228.3 LBS

## 2021-06-21 PROBLEM — K81.0 ACUTE CHOLECYSTITIS: Status: ACTIVE | Noted: 2021-06-20

## 2021-06-21 LAB
ALBUMIN SERPL-MCNC: 2.7 G/DL (ref 3.4–5)
ALP SERPL-CCNC: 89 U/L (ref 40–150)
ALT SERPL W P-5'-P-CCNC: 29 U/L (ref 0–50)
ANION GAP SERPL CALCULATED.3IONS-SCNC: 4 MMOL/L (ref 3–14)
AST SERPL W P-5'-P-CCNC: 12 U/L (ref 0–45)
BASOPHILS # BLD AUTO: 0.1 10E9/L (ref 0–0.2)
BASOPHILS NFR BLD AUTO: 0.6 %
BILIRUB SERPL-MCNC: 0.9 MG/DL (ref 0.2–1.3)
BUN SERPL-MCNC: 5 MG/DL (ref 7–30)
CALCIUM SERPL-MCNC: 8 MG/DL (ref 8.5–10.1)
CHLORIDE SERPL-SCNC: 109 MMOL/L (ref 94–109)
CO2 SERPL-SCNC: 27 MMOL/L (ref 20–32)
CREAT SERPL-MCNC: 0.95 MG/DL (ref 0.52–1.04)
DIFFERENTIAL METHOD BLD: NORMAL
EOSINOPHIL # BLD AUTO: 0.3 10E9/L (ref 0–0.7)
EOSINOPHIL NFR BLD AUTO: 2.6 %
ERYTHROCYTE [DISTWIDTH] IN BLOOD BY AUTOMATED COUNT: 11.9 % (ref 10–15)
GFR SERPL CREATININE-BSD FRML MDRD: 74 ML/MIN/{1.73_M2}
GLUCOSE SERPL-MCNC: 86 MG/DL (ref 70–99)
HCG UR QL: NEGATIVE
HCT VFR BLD AUTO: 36.6 % (ref 35–47)
HGB BLD-MCNC: 12.1 G/DL (ref 11.7–15.7)
IMM GRANULOCYTES # BLD: 0.1 10E9/L (ref 0–0.4)
IMM GRANULOCYTES NFR BLD: 0.5 %
LYMPHOCYTES # BLD AUTO: 2.7 10E9/L (ref 0.8–5.3)
LYMPHOCYTES NFR BLD AUTO: 24.9 %
MCH RBC QN AUTO: 28.8 PG (ref 26.5–33)
MCHC RBC AUTO-ENTMCNC: 33.1 G/DL (ref 31.5–36.5)
MCV RBC AUTO: 87 FL (ref 78–100)
MONOCYTES # BLD AUTO: 1.1 10E9/L (ref 0–1.3)
MONOCYTES NFR BLD AUTO: 9.7 %
NEUTROPHILS # BLD AUTO: 6.8 10E9/L (ref 1.6–8.3)
NEUTROPHILS NFR BLD AUTO: 61.7 %
NRBC # BLD AUTO: 0 10*3/UL
NRBC BLD AUTO-RTO: 0 /100
PLATELET # BLD AUTO: 278 10E9/L (ref 150–450)
POTASSIUM SERPL-SCNC: 4.2 MMOL/L (ref 3.4–5.3)
PROT SERPL-MCNC: 6.3 G/DL (ref 6.8–8.8)
RBC # BLD AUTO: 4.2 10E12/L (ref 3.8–5.2)
SODIUM SERPL-SCNC: 139 MMOL/L (ref 133–144)
WBC # BLD AUTO: 11 10E9/L (ref 4–11)

## 2021-06-21 PROCEDURE — 250N000013 HC RX MED GY IP 250 OP 250 PS 637: Performed by: ANESTHESIOLOGY

## 2021-06-21 PROCEDURE — 710N000009 HC RECOVERY PHASE 1, LEVEL 1, PER MIN: Performed by: SURGERY

## 2021-06-21 PROCEDURE — 85025 COMPLETE CBC W/AUTO DIFF WBC: CPT | Performed by: PHYSICIAN ASSISTANT

## 2021-06-21 PROCEDURE — 47562 LAPAROSCOPIC CHOLECYSTECTOMY: CPT | Performed by: SURGERY

## 2021-06-21 PROCEDURE — 250N000009 HC RX 250: Performed by: SURGERY

## 2021-06-21 PROCEDURE — 99217 PR OBSERVATION CARE DISCHARGE: CPT | Performed by: PHYSICIAN ASSISTANT

## 2021-06-21 PROCEDURE — 47562 LAPAROSCOPIC CHOLECYSTECTOMY: CPT | Mod: AS | Performed by: PHYSICIAN ASSISTANT

## 2021-06-21 PROCEDURE — 360N000076 HC SURGERY LEVEL 3, PER MIN: Performed by: SURGERY

## 2021-06-21 PROCEDURE — 96376 TX/PRO/DX INJ SAME DRUG ADON: CPT | Mod: 59

## 2021-06-21 PROCEDURE — 96375 TX/PRO/DX INJ NEW DRUG ADDON: CPT

## 2021-06-21 PROCEDURE — 370N000017 HC ANESTHESIA TECHNICAL FEE, PER MIN: Performed by: SURGERY

## 2021-06-21 PROCEDURE — 88304 TISSUE EXAM BY PATHOLOGIST: CPT | Mod: 26 | Performed by: PATHOLOGY

## 2021-06-21 PROCEDURE — 258N000001 HC RX 258: Performed by: SURGERY

## 2021-06-21 PROCEDURE — 36415 COLL VENOUS BLD VENIPUNCTURE: CPT | Performed by: PHYSICIAN ASSISTANT

## 2021-06-21 PROCEDURE — 250N000011 HC RX IP 250 OP 636: Performed by: PHYSICIAN ASSISTANT

## 2021-06-21 PROCEDURE — 99204 OFFICE O/P NEW MOD 45 MIN: CPT | Mod: 57 | Performed by: SURGERY

## 2021-06-21 PROCEDURE — 250N000011 HC RX IP 250 OP 636: Performed by: NURSE ANESTHETIST, CERTIFIED REGISTERED

## 2021-06-21 PROCEDURE — 81025 URINE PREGNANCY TEST: CPT | Performed by: ANESTHESIOLOGY

## 2021-06-21 PROCEDURE — 710N000012 HC RECOVERY PHASE 2, PER MINUTE: Performed by: SURGERY

## 2021-06-21 PROCEDURE — 258N000003 HC RX IP 258 OP 636: Performed by: ANESTHESIOLOGY

## 2021-06-21 PROCEDURE — 250N000011 HC RX IP 250 OP 636: Performed by: SURGERY

## 2021-06-21 PROCEDURE — 250N000009 HC RX 250: Performed by: ANESTHESIOLOGY

## 2021-06-21 PROCEDURE — G0378 HOSPITAL OBSERVATION PER HR: HCPCS

## 2021-06-21 PROCEDURE — 88304 TISSUE EXAM BY PATHOLOGIST: CPT | Mod: TC | Performed by: SURGERY

## 2021-06-21 PROCEDURE — 999N000141 HC STATISTIC PRE-PROCEDURE NURSING ASSESSMENT: Performed by: SURGERY

## 2021-06-21 PROCEDURE — 272N000001 HC OR GENERAL SUPPLY STERILE: Performed by: SURGERY

## 2021-06-21 PROCEDURE — 80053 COMPREHEN METABOLIC PANEL: CPT | Performed by: PHYSICIAN ASSISTANT

## 2021-06-21 PROCEDURE — 250N000009 HC RX 250: Performed by: NURSE ANESTHETIST, CERTIFIED REGISTERED

## 2021-06-21 RX ORDER — DEXAMETHASONE SODIUM PHOSPHATE 4 MG/ML
INJECTION, SOLUTION INTRA-ARTICULAR; INTRALESIONAL; INTRAMUSCULAR; INTRAVENOUS; SOFT TISSUE PRN
Status: DISCONTINUED | OUTPATIENT
Start: 2021-06-21 | End: 2021-06-21

## 2021-06-21 RX ORDER — FENTANYL CITRATE 50 UG/ML
25-50 INJECTION, SOLUTION INTRAMUSCULAR; INTRAVENOUS
Status: DISCONTINUED | OUTPATIENT
Start: 2021-06-21 | End: 2021-06-21 | Stop reason: HOSPADM

## 2021-06-21 RX ORDER — HYDROCODONE BITARTRATE AND ACETAMINOPHEN 5; 325 MG/1; MG/1
1 TABLET ORAL
Status: DISCONTINUED | OUTPATIENT
Start: 2021-06-21 | End: 2021-06-21 | Stop reason: HOSPADM

## 2021-06-21 RX ORDER — HYDROMORPHONE HYDROCHLORIDE 1 MG/ML
.3-.5 INJECTION, SOLUTION INTRAMUSCULAR; INTRAVENOUS; SUBCUTANEOUS EVERY 10 MIN PRN
Status: DISCONTINUED | OUTPATIENT
Start: 2021-06-21 | End: 2021-06-21 | Stop reason: HOSPADM

## 2021-06-21 RX ORDER — LIDOCAINE 40 MG/G
CREAM TOPICAL
Status: DISCONTINUED | OUTPATIENT
Start: 2021-06-21 | End: 2021-06-21 | Stop reason: HOSPADM

## 2021-06-21 RX ORDER — GLYCOPYRROLATE 0.2 MG/ML
INJECTION, SOLUTION INTRAMUSCULAR; INTRAVENOUS PRN
Status: DISCONTINUED | OUTPATIENT
Start: 2021-06-21 | End: 2021-06-21

## 2021-06-21 RX ORDER — SODIUM CHLORIDE, SODIUM LACTATE, POTASSIUM CHLORIDE, CALCIUM CHLORIDE 600; 310; 30; 20 MG/100ML; MG/100ML; MG/100ML; MG/100ML
INJECTION, SOLUTION INTRAVENOUS CONTINUOUS
Status: DISCONTINUED | OUTPATIENT
Start: 2021-06-21 | End: 2021-06-21 | Stop reason: HOSPADM

## 2021-06-21 RX ORDER — ONDANSETRON 2 MG/ML
4 INJECTION INTRAMUSCULAR; INTRAVENOUS EVERY 30 MIN PRN
Status: DISCONTINUED | OUTPATIENT
Start: 2021-06-21 | End: 2021-06-21 | Stop reason: HOSPADM

## 2021-06-21 RX ORDER — CEFAZOLIN SODIUM 2 G/100ML
2 INJECTION, SOLUTION INTRAVENOUS SEE ADMIN INSTRUCTIONS
Status: DISCONTINUED | OUTPATIENT
Start: 2021-06-21 | End: 2021-06-21 | Stop reason: HOSPADM

## 2021-06-21 RX ORDER — HYDRALAZINE HYDROCHLORIDE 20 MG/ML
2.5-5 INJECTION INTRAMUSCULAR; INTRAVENOUS EVERY 10 MIN PRN
Status: DISCONTINUED | OUTPATIENT
Start: 2021-06-21 | End: 2021-06-21 | Stop reason: HOSPADM

## 2021-06-21 RX ORDER — PROPOFOL 10 MG/ML
INJECTION, EMULSION INTRAVENOUS PRN
Status: DISCONTINUED | OUTPATIENT
Start: 2021-06-21 | End: 2021-06-21

## 2021-06-21 RX ORDER — MEPERIDINE HYDROCHLORIDE 25 MG/ML
12.5 INJECTION INTRAMUSCULAR; INTRAVENOUS; SUBCUTANEOUS
Status: DISCONTINUED | OUTPATIENT
Start: 2021-06-21 | End: 2021-06-21 | Stop reason: HOSPADM

## 2021-06-21 RX ORDER — CEFAZOLIN SODIUM 2 G/100ML
2 INJECTION, SOLUTION INTRAVENOUS
Status: COMPLETED | OUTPATIENT
Start: 2021-06-21 | End: 2021-06-21

## 2021-06-21 RX ORDER — SCOLOPAMINE TRANSDERMAL SYSTEM 1 MG/1
1 PATCH, EXTENDED RELEASE TRANSDERMAL
Status: DISCONTINUED | OUTPATIENT
Start: 2021-06-21 | End: 2021-06-21 | Stop reason: HOSPADM

## 2021-06-21 RX ORDER — ONDANSETRON 4 MG/1
4 TABLET, ORALLY DISINTEGRATING ORAL EVERY 30 MIN PRN
Status: DISCONTINUED | OUTPATIENT
Start: 2021-06-21 | End: 2021-06-21 | Stop reason: HOSPADM

## 2021-06-21 RX ORDER — LIDOCAINE HYDROCHLORIDE 10 MG/ML
INJECTION, SOLUTION INFILTRATION; PERINEURAL PRN
Status: DISCONTINUED | OUTPATIENT
Start: 2021-06-21 | End: 2021-06-21

## 2021-06-21 RX ORDER — NEOSTIGMINE METHYLSULFATE 1 MG/ML
VIAL (ML) INJECTION PRN
Status: DISCONTINUED | OUTPATIENT
Start: 2021-06-21 | End: 2021-06-21

## 2021-06-21 RX ORDER — ONDANSETRON 2 MG/ML
INJECTION INTRAMUSCULAR; INTRAVENOUS PRN
Status: DISCONTINUED | OUTPATIENT
Start: 2021-06-21 | End: 2021-06-21

## 2021-06-21 RX ORDER — HYDROCODONE BITARTRATE AND ACETAMINOPHEN 5; 325 MG/1; MG/1
1-2 TABLET ORAL EVERY 4 HOURS PRN
Qty: 10 TABLET | Refills: 0 | Status: SHIPPED | OUTPATIENT
Start: 2021-06-21 | End: 2021-06-24

## 2021-06-21 RX ORDER — LABETALOL HYDROCHLORIDE 5 MG/ML
10 INJECTION, SOLUTION INTRAVENOUS
Status: DISCONTINUED | OUTPATIENT
Start: 2021-06-21 | End: 2021-06-21 | Stop reason: HOSPADM

## 2021-06-21 RX ORDER — FENTANYL CITRATE 50 UG/ML
INJECTION, SOLUTION INTRAMUSCULAR; INTRAVENOUS PRN
Status: DISCONTINUED | OUTPATIENT
Start: 2021-06-21 | End: 2021-06-21

## 2021-06-21 RX ORDER — PROPOFOL 10 MG/ML
INJECTION, EMULSION INTRAVENOUS CONTINUOUS PRN
Status: DISCONTINUED | OUTPATIENT
Start: 2021-06-21 | End: 2021-06-21

## 2021-06-21 RX ORDER — BUPIVACAINE HYDROCHLORIDE 5 MG/ML
INJECTION, SOLUTION EPIDURAL; INTRACAUDAL PRN
Status: DISCONTINUED | OUTPATIENT
Start: 2021-06-21 | End: 2021-06-21 | Stop reason: HOSPADM

## 2021-06-21 RX ADMIN — POTASSIUM CHLORIDE AND SODIUM CHLORIDE: 900; 150 INJECTION, SOLUTION INTRAVENOUS at 01:52

## 2021-06-21 RX ADMIN — ONDANSETRON HYDROCHLORIDE 4 MG: 2 INJECTION, SOLUTION INTRAVENOUS at 11:23

## 2021-06-21 RX ADMIN — Medication 1 LOZENGE: at 15:10

## 2021-06-21 RX ADMIN — PROPOFOL 100 MCG/KG/MIN: 10 INJECTION, EMULSION INTRAVENOUS at 11:20

## 2021-06-21 RX ADMIN — HYDROMORPHONE HYDROCHLORIDE 0.5 MG: 1 INJECTION, SOLUTION INTRAMUSCULAR; INTRAVENOUS; SUBCUTANEOUS at 00:59

## 2021-06-21 RX ADMIN — MIDAZOLAM 2 MG: 1 INJECTION INTRAMUSCULAR; INTRAVENOUS at 10:56

## 2021-06-21 RX ADMIN — KETOROLAC TROMETHAMINE 15 MG: 15 INJECTION, SOLUTION INTRAMUSCULAR; INTRAVENOUS at 00:58

## 2021-06-21 RX ADMIN — ROCURONIUM BROMIDE 50 MG: 10 INJECTION INTRAVENOUS at 11:02

## 2021-06-21 RX ADMIN — LIDOCAINE HYDROCHLORIDE 30 MG: 10 INJECTION, SOLUTION INFILTRATION; PERINEURAL at 11:02

## 2021-06-21 RX ADMIN — HYDROMORPHONE HYDROCHLORIDE 1 MG: 1 INJECTION, SOLUTION INTRAMUSCULAR; INTRAVENOUS; SUBCUTANEOUS at 11:15

## 2021-06-21 RX ADMIN — DEXAMETHASONE SODIUM PHOSPHATE 4 MG: 4 INJECTION, SOLUTION INTRA-ARTICULAR; INTRALESIONAL; INTRAMUSCULAR; INTRAVENOUS; SOFT TISSUE at 11:02

## 2021-06-21 RX ADMIN — TAZOBACTAM SODIUM AND PIPERACILLIN SODIUM 3.38 G: 375; 3 INJECTION, SOLUTION INTRAVENOUS at 01:05

## 2021-06-21 RX ADMIN — FENTANYL CITRATE 100 MCG: 50 INJECTION, SOLUTION INTRAMUSCULAR; INTRAVENOUS at 11:01

## 2021-06-21 RX ADMIN — CEFAZOLIN SODIUM 2 G: 2 INJECTION, SOLUTION INTRAVENOUS at 10:57

## 2021-06-21 RX ADMIN — GLYCOPYRROLATE 0.2 MG: 0.2 INJECTION, SOLUTION INTRAMUSCULAR; INTRAVENOUS at 11:01

## 2021-06-21 RX ADMIN — NEOSTIGMINE METHYLSULFATE 3 MG: 1 INJECTION, SOLUTION INTRAVENOUS at 12:31

## 2021-06-21 RX ADMIN — HYDROMORPHONE HYDROCHLORIDE 0.5 MG: 1 INJECTION, SOLUTION INTRAMUSCULAR; INTRAVENOUS; SUBCUTANEOUS at 07:14

## 2021-06-21 RX ADMIN — PROPOFOL 200 MG: 10 INJECTION, EMULSION INTRAVENOUS at 11:02

## 2021-06-21 RX ADMIN — GLYCOPYRROLATE 0.6 MG: 0.2 INJECTION, SOLUTION INTRAMUSCULAR; INTRAVENOUS at 12:31

## 2021-06-21 RX ADMIN — SODIUM CHLORIDE, POTASSIUM CHLORIDE, SODIUM LACTATE AND CALCIUM CHLORIDE: 600; 310; 30; 20 INJECTION, SOLUTION INTRAVENOUS at 10:56

## 2021-06-21 RX ADMIN — SCOPALAMINE 1 PATCH: 1 PATCH, EXTENDED RELEASE TRANSDERMAL at 15:10

## 2021-06-21 RX ADMIN — TAZOBACTAM SODIUM AND PIPERACILLIN SODIUM 3.38 G: 375; 3 INJECTION, SOLUTION INTRAVENOUS at 07:43

## 2021-06-21 ASSESSMENT — MIFFLIN-ST. JEOR: SCORE: 1674.69

## 2021-06-21 NOTE — ANESTHESIA PREPROCEDURE EVALUATION
Anesthesia Pre-Procedure Evaluation    Patient: Sierra Tovar   MRN: [id number] : 1980        Preoperative Diagnosis: Acute cholecystitis [K81.0]   Procedure : Procedure(s):  CHOLECYSTECTOMY, LAPAROSCOPIC     No past medical history on file.   No past surgical history on file.   Allergies   Allergen Reactions     Metronidazole Unknown      Social History     Tobacco Use     Smoking status: Never Smoker   Substance Use Topics     Alcohol use: Not on file      Wt Readings from Last 1 Encounters:   21 103.6 kg (228 lb 4.8 oz)        Anesthesia Evaluation   Pt has had prior anesthetic. Type: General.    No history of anesthetic complications       ROS/MED HX  ENT/Pulmonary:  - neg pulmonary ROS     Neurologic:  - neg neurologic ROS     Cardiovascular:  - neg cardiovascular ROS     METS/Exercise Tolerance:     Hematologic:  - neg hematologic  ROS     Musculoskeletal:  - neg musculoskeletal ROS     GI/Hepatic:     (+) cholecystitis/cholelithiasis,     Renal/Genitourinary:  - neg Renal ROS     Endo:     (+) Obesity,     Psychiatric/Substance Use:  - neg psychiatric ROS     Infectious Disease: Comment: HSV 2      Malignancy:       Other:            Physical Exam    Airway        Mallampati: II   TM distance: > 3 FB   Neck ROM: full   Mouth opening: > 3 cm    Respiratory Devices and Support         Dental  no notable dental history         Cardiovascular   cardiovascular exam normal          Pulmonary   pulmonary exam normal                OUTSIDE LABS:  CBC:   Lab Results   Component Value Date    WBC 11.0 2021    WBC 15.1 (H) 2021    HGB 12.1 2021    HGB 14.8 2021    HCT 36.6 2021    HCT 44.4 2021     2021     2021     BMP:   Lab Results   Component Value Date     2021     2021    POTASSIUM 4.2 2021    POTASSIUM 3.6 2021    CHLORIDE 109 2021    CHLORIDE 104 2021    CO2 27 2021    CO2 28  06/20/2021    BUN 5 (L) 06/21/2021    BUN 4 (L) 06/20/2021    CR 0.95 06/21/2021    CR 0.88 06/20/2021    GLC 86 06/21/2021     (H) 06/20/2021     COAGS: No results found for: PTT, INR, FIBR  POC: No results found for: BGM, HCG, HCGS  HEPATIC:   Lab Results   Component Value Date    ALBUMIN 2.7 (L) 06/21/2021    PROTTOTAL 6.3 (L) 06/21/2021    ALT 29 06/21/2021    AST 12 06/21/2021    ALKPHOS 89 06/21/2021    BILITOTAL 0.9 06/21/2021     OTHER:   Lab Results   Component Value Date    TIANA 8.0 (L) 06/21/2021    LIPASE 53 (L) 06/20/2021       Anesthesia Plan    ASA Status:  2   NPO Status:  NPO Appropriate    Anesthesia Type: General.     - Airway: ETT   Induction: Intravenous, Propofol.   Maintenance: Balanced.        Consents    Anesthesia Plan(s) and associated risks, benefits, and realistic alternatives discussed. Questions answered and patient/representative(s) expressed understanding.     - Discussed with:  Patient         Postoperative Care    Pain management: IV analgesics, Oral pain medications.   PONV prophylaxis: Ondansetron (or other 5HT-3), Dexamethasone or Solumedrol     Comments:                Raúl Conroy MD

## 2021-06-21 NOTE — ANESTHESIA PROCEDURE NOTES
Airway       Patient location during procedure: OR  Staff -        CRNA: Feng Matthew APRN CRNA       Performed By: CRNAIndications and Patient Condition       Indications for airway management: kelsea-procedural       Induction type:intravenous       Mask difficulty assessment: 0 - not attempted    Final Airway Details       Final airway type: endotracheal airway       Successful airway: ETT - single and Oral  Endotracheal Airway Details        ETT size (mm): 7.0       Cuffed: yes       Successful intubation technique: direct laryngoscopy       DL Blade Type: Mejias 2       Grade View of Cords: 1       Adjucts: stylet       Position: Right       Measured from: lips       Secured at (cm): 22       Bite block used: None    Post intubation assessment        Placement verified by: capnometry, equal breath sounds and chest rise        Number of attempts at approach: 1       Secured with: plastic tape       Ease of procedure: easy       Dentition: Intact    Medication(s) Administered   Medication Administration Time: 6/21/2021 11:06 AM

## 2021-06-21 NOTE — PLAN OF CARE
Patient voices intermittent nausea and hesitancy to discharge related to the nausea.  Order received from [facility] for scopolamine patch received.

## 2021-06-21 NOTE — BRIEF OP NOTE
[facility]    Brief Operative Note    Pre-operative diagnosis: Acute cholecystitis [K81.0]  Post-operative diagnosis Acute cholecystitis    Procedure: Procedure(s):  CHOLECYSTECTOMY, LAPAROSCOPIC  Surgeon: Surgeon(s) and Role:     * Iain Mendez MD - Primary     * Ava Pereira PA-C - Assisting  Anesthesia: General   Estimated blood loss: Less than 10 ml  Drains: None  Specimens:   ID Type Source Tests Collected by Time Destination   A : Gallbladder and contents Tissue Gallbladder and Contents SURGICAL PATHOLOGY EXAM Iain Mendez MD 6/21/2021 12:15 PM      Findings:   Edematous gallbladder with stones..  Complications: None.  Implants: * No implants in log *

## 2021-06-21 NOTE — PROGRESS NOTES
PRIMARY DIAGNOSIS: ACUTE CHOLECYSTITIS        OUTPATIENT/OBSERVATION GOALS TO BE MET BEFORE DISCHARGE:     1. Pain status: Improved but still requiring IV narcotics.  2. Stable vital signs and labs (if performed) at disposition: yes  3. Tolerating adequate PO diet:No-NPO   4. Successful cholecystectomy or clear follow up plan with General Surgery team if immediate surgery not performed. No, Lap rashid planned for 11 am  5. ADLs back to baseline?  yes  6. Activity and level of assistance: up ind   7. Barriers to discharge noted: yes surgery needed today     2/10 RUQ pain after IV dilaudid. No nausea. Vitals stable. Lives with significant other. NPO. General surgery saw. Up ind.      Discharge Planner Nurse   Safe discharge environment identified: Yes  Barriers to discharge: Yes, unless medically cleared    Entered by: Adelia Reyes 0800   Please review provider order for any additional goals.   Nurse to notify provider when observation goals have been met and patient is ready for discharge.

## 2021-06-21 NOTE — PLAN OF CARE
PRIMARY DIAGNOSIS: ACUTE CHOLECYSTITIS      OUTPATIENT/OBSERVATION GOALS TO BE MET BEFORE DISCHARGE:    1. Pain status: Improved but still requiring IV narcotics.  2. Stable vital signs and labs (if performed) at disposition:   3. Tolerating adequate PO diet: Yes, NPO after 2400  4. Successful cholecystectomy or clear follow up plan with General Surgery team if immediate surgery not performed Yes  5. ADLs back to baseline?  No  6. Activity and level of assistance: Up with standby assistance.  7. Barriers to discharge noted Yes  Unless medically cleared    VSS  LS clear, adequate sats on room air.  Patient presenting to the Unit with c/o right upper quadrent abdominal pain, improved after Dilaudid IV.  Patient tolerating evening meal tray without nausea or emesis.  Maintenance IV of 0.9 NS with 20 meqs KCL continues at 100 mls/hour.    Patient up with standby assist, ambulating to the bathroom prn.  Plan:  Continue to provide supportive cares. NPO after 2400 for probable surgery tomorrow.  Pain control.      Discharge Planner Nurse   Safe discharge environment identified: Yes  Barriers to discharge: Yes, unless medically cleared         Entered by: Karen M. Lesch 06/20/2021 9:11 PM     Please review provider order for any additional goals.   Nurse to notify provider when observation goals have been met and patient is ready for discharge.

## 2021-06-21 NOTE — ANESTHESIA CARE TRANSFER NOTE
Patient: Sierra Tovar    Procedure(s):  CHOLECYSTECTOMY, LAPAROSCOPIC    Diagnosis: Acute cholecystitis [K81.0]  Diagnosis Additional Information: No value filed.    Anesthesia Type:   General     Note:    Oropharynx: oropharynx clear of all foreign objects  Level of Consciousness: drowsy  Oxygen Supplementation: face mask  Level of Supplemental Oxygen (L/min / FiO2): 6  Independent Airway: airway patency satisfactory and stable  Dentition: dentition unchanged  Vital Signs Stable: post-procedure vital signs reviewed and stable  Report to RN Given: handoff report given  Patient transferred to: PACU    Handoff Report: Identifed the Patient, Identified the Reponsible Provider, Reviewed the pertinent medical history, Discussed the surgical course, Reviewed Intra-OP anesthesia mangement and issues during anesthesia, Set expectations for post-procedure period and Allowed opportunity for questions and acknowledgement of understanding      Vitals: (Last set prior to Anesthesia Care Transfer)  CRNA VITALS  6/21/2021 1207 - 6/21/2021 1241      6/21/2021             Pulse:  87    SpO2:  95 %    Resp Rate (observed):  (!) 5        Electronically Signed By: RASHIDA Madrid CRNA  June 21, 2021  12:41 PM

## 2021-06-21 NOTE — CONSULTS
Consult Date: 06/20/2021    HISTORY OF PRESENT ILLNESS:  Mr. Tovar is a 41-year-old female with no preceding history of known biliary tract disease, came to the ED yesterday with intermittent, but persistent epigastric and right upper quadrant pain.  She states that this started on Tuesday evening of last week after having a carne asada tacos for dinner.  She had several bouts of nausea and vomiting over the ensuing days, She felt hot when she was having increased pain, but did not record any fevers at home.  She denies any jaundice or icterus.  Because of persistence of her symptoms, she sought evaluation in our Emergency Department yesterday.  There, she was noted to have a white blood cell count of 15,000 and ultrasound, which showed gallstones with a 4 mm gallbladder wall and positive sonographic Garcia sign.  This morning, she states that she is still quite uncomfortable and persistent pain requiring IV narcotics.  She has had no previous abdominal surgeries or any other significant medical history.    PAST MEDICAL AND SURGICAL HISTORY:  Patient has no chronic medical concerns as noted above.    MEDICATIONS:  Oral contraceptives.    ALLERGIES:  The patient has drug allergies to metronidazole reaction is not noted.    SOCIAL HISTORY:  The patient is .  She does not smoke.  She admits to occasional alcohol use.  Physical exam status, afebrile.    PHYSICAL EXAMINATION:    VITAL SIGNS:  Temperature is 98.4, pulse 76 with a blood pressure of 150/70, respirations 16 with 96% saturations on room air.  GENERAL:  She is alert and appropriate, nontoxic, but clearly uncomfortable.  She has no appreciable scleral icterus or jaundice.  CARDIOVASCULAR:  Heart sounds are regular, without murmurs.  LUNGS:  Breath sounds are clear, without wheezes.  ABDOMEN:  Soft without overt distention.  She has some mild epigastric tenderness and focal right upper quadrant tenderness with a clinical Garcia sign on deep  inspiration.    LABORATORY EXAMINATION:  Notable for white blood cell count of 15.1 thousand on admission, currently 11.0 thousand, hemoglobin is normal at 14.8.  All of her LFTs were also normal as was her lipase.  Asymptomatic COVID swab was negative as well.    IMAGING:  I personally reviewed the patient's ultrasound done yesterday afternoon, that shows gallstones with a diffusely thickened gallbladder wall up to 4 mm.  There is no duct dilation.  There was a reported sonographic Garcia sign.    IMPRESSION AND RECOMMENDATIONS:  A 41-year-old female with signs and symptoms consistent with acute cholecystitis.  She is having pain and tenderness today because of her persistent symptoms, I have offered cholecystectomy.  Risks of the operation including infection, bleeding harm to structures, open conversion, retained stone, bile leak, common duct injury and chronic diarrhea.  All reviewed, the patient verbalized understanding of the above and wishes to proceed today.  We will plan on proceeding on add-on schedule as time allows.  Tentatively given the timing of 11:00 this morning.  She understands that she will likely discharge home after several hours of recovery.  All other questions were answered to the best of my ability.     Thank you very much for this consultation.    Iain Briggs MD        D: 2021   T: 2021   MT: [email or web address]    Name:     STAR WRAY  MRN:      [id number]        Account:      [id number]   :      1980           Consult Date: 2021     Document: [id number]

## 2021-06-21 NOTE — DISCHARGE SUMMARY
[facility] Outpatient / Observation Unit  Discharge Summary        Sierra Tovar MRN# [id number]   YOB: 1980 Age: 41 year old     Date of Admission:  6/20/2021  Date of Discharge:  6/21/2021  Admitting Physician:  Sohail Caceres MD  Discharge Physician: Lucero Monk PA-C  Discharging Service: Hospitalist      Primary Provider: No Ref-Primary, Physician  Primary Care Physician Phone Number: None         Primary Discharge Diagnoses:    Sierra Tovar is a 41 year old female with no medical problems who [facility] ED with RUQ abdominal pain, nausea, and vomiting worsening since Tuesday related to intractable biliary colic.  CMP within normal limits.  CBC notable for WBC 15.1 with left shift.  RUQ U/S revealed cholelithiasis, GB wall thickening, and positive Garcia's sign without biliary dilatation suggesting acute cholecystitis.     #Acute cholecystitis with cholelithiasis  #S/p laparoscopic cholecystectomy         Secondary Discharge Diagnoses:   History reviewed. No pertinent past medical history.           Code Status:      Full Code        Brief Hospital Summary:        Reason for your hospital stay      You were admitted for concerns of stomach pain and nausea/vomiting   related to your gallbladder. Imaging in the ER should that your   gallbladder was inflamed/infected. There was not evidence of a stone in   the biliary tract. You were treated with antibiotic initially. Your pain   and nausea was controlled with narcotics and anti nausea medications. You   were seen by surgery who opted to remove your gallbladder. You were doing   fine post operatively to allowed to discharge home.           Please refer to initial admission history and physical for further details.   Briefly, Sierra Tovar was admitted on 6/20/2021 with intractable biliary colic/acute cholecystitis. Initial work up in the ED did not reveal evidence of sepsis to require inpatient hospitalization. Pt was registered  to the Observation Unit for pain control and supportive measures.     Pt was resuscitated with IVF, and anti-emetics. Laboratory and imaging results indicated: leukocytosis and RUQ showed cholelithiasis with gallbladder wall thickening. General surgery was consulted. Patient underwent laparoscopic cholecystectomy (please see detailed operative report). Post-op, pt did well. Pain control was transitioned from IV to PO form. At the time of discharge, pt's pain was controlled and was able to tolerate PO intake.  Medications were reviewed. Pt is instructed to follow up as below.           Significant Lab During Hospitalization:      Recent Labs   Lab 06/21/21  0647 06/20/21  1202   WBC 11.0 15.1*   HGB 12.1 14.8   HCT 36.6 44.4   MCV 87 86    392     Recent Labs   Lab 06/21/21  0647 06/20/21  1202    138   POTASSIUM 4.2 3.6   CHLORIDE 109 104   CO2 27 28   ANIONGAP 4 6   GLC 86 101*   BUN 5* 4*   CR 0.95 0.88   GFRESTIMATED 74 82   GFRESTBLACK 86 >90   TIANA 8.0* 9.0   PROTTOTAL 6.3* 8.3   ALBUMIN 2.7* 3.7   BILITOTAL 0.9 0.8   ALKPHOS 89 117   AST 12 15   ALT 29 36              Significant Imaging During Hospitalization:      Results for orders placed or performed during the hospital encounter of 06/20/21   Abdomen US, limited (RUQ only)    Narrative    US ABDOMEN LIMITED 6/20/2021 1:28 PM    CLINICAL HISTORY: Right upper quadrant pain.    TECHNIQUE: Limited abdominal ultrasound.    COMPARISON: None.    FINDINGS:    GALLBLADDER: There are shadowing gallstones in the gallbladder. There  is mild gallbladder wall thickening measuring 4 mm. Patient reports  tenderness with direct transducer pressure over the gallbladder.    BILE DUCTS: There is no biliary dilatation. The common duct measures  3mm.    LIVER: Normal where seen.    RIGHT KIDNEY: No hydronephrosis.    PANCREAS: The visualized portions of the pancreas are normal.    No ascites.      Impression    IMPRESSION:  1.  Cholelithiasis, gallbladder wall  "thickening, and positive  sonographic Garcia's sign compatible with acute cholecystitis.    TIP BROWN MD              Pending Results:      None        Consultations This Hospital Stay:      Consultation during this admission received from surgery         Discharge Instructions and Follow-Up:      Follow-up Appointments     Follow-up and recommended labs and tests       Follow up with primary care provider, Physician No Ref-Primary, within 7   days for hospital follow- up.  No follow up labs or test are needed.  Follow up with general surgery per their recommendations.               Discharge Disposition:      Discharged to home         Discharge Medications:        Current Discharge Medication List      CONTINUE these medications which have NOT CHANGED    Details   norgestimate-ethinyl estradiol (ORTHO-CYCLEN, SPRINTEC) 0.25-35 MG-MCG per tablet Take 1 tablet by mouth daily               Allergies:         Allergies   Allergen Reactions     Metronidazole Unknown         Condition and Physical on Discharge:      Discharge condition: Stable   Vitals: Blood pressure (!) 142/83, pulse 88, temperature 96.9  F (36.1  C), temperature source Temporal, resp. rate 16, height 1.6 m (5' 3\"), weight 103.6 kg (228 lb 4.8 oz), SpO2 99 %.  228 lbs 4.8 oz      GENERAL:  Comfortable.  PSYCH: pleasant, oriented, No acute distress.  HEENT:  PERRLA. Normal conjunctiva, normal hearing, nasal mucosa and oropharynx are normal.  NECK:  Supple, no neck vein distention, adenopathy or bruits, normal thyroid.  HEART:  Normal S1, S2 with no murmur, no pericardial rub, gallops or S3 or S4.  LUNGS:  Clear to auscultation, normal respiratory effort. No wheezing, rales or ronchi.  ABDOMEN:  Soft, no hepatosplenomegaly, normal bowel sounds. Appropriate kelsea-incisional tenderness. Dressing c/d/i  EXTREMITIES:  No pedal edema, +2 radial and posterior tibial pulses bilateral and equal.  SKIN:  Dry to touch, No rash, wound or " ulcerations.  NEUROLOGIC:  CN 2-12 intact, BL 5/5 symmetric upper and lower extremity strength, sensation is intact with no focal deficits.     Lucero Monk PA-C

## 2021-06-21 NOTE — PLAN OF CARE
"PRIMARY DIAGNOSIS: ACUTE CHOLECYSTITIS        OUTPATIENT/OBSERVATION GOALS TO BE MET BEFORE DISCHARGE:     1. Pain status: Improved but still requiring IV narcotics.  2. Stable vital signs and labs (if performed) at disposition:   3. Tolerating adequate PO diet: Yes, NPO after 2400  4. Successful cholecystectomy or clear follow up plan with General Surgery team if immediate surgery not performed.  Plan is for surgery later this afternoon  5. ADLs back to baseline?  No, but independent at this time in. Informed her to call after receiving   6. Activity and level of assistance: Up with standby assistance.  7. Barriers to discharge noted.  No unless is not postoperatively stable.    BP (!) 161/84 (BP Location: Left arm)   Pulse 71   Temp 99.3  F (37.4  C) (Oral)   Resp 16   Ht 1.6 m (5' 3\")   Wt 101.6 kg (224 lb)   SpO2 96%   BMI 39.68 kg/m          C/o right upper quadrent abdominal pain, improved after Dilaudid IV.  Patient tolerating evening meal tray without nausea or emesis.  Maintenance IV of 0.9 NS with 20 meqs KCL continues at 100 mls/hour.    Patient up with standby assist, ambulating to the bathroom prn.  Plan:  Continue to provide supportive cares. NPO after 2400 for probable surgery tomorrow.  Pain control.       Discharge Planner Nurse   Safe discharge environment identified: Yes  Barriers to discharge: Yes, unless medically cleared          Entered by: Agnes Collazo RN on 6/21/2021 at 0000  Please review provider order for any additional goals.   Nurse to notify provider when observation goals have been met and patient is ready for discharge.        "

## 2021-06-21 NOTE — OP NOTE
Procedure Date: 06/21/2021    PREOPERATIVE DIAGNOSIS:  Acute cholecystitis.    POSTOPERATIVE DIAGNOSIS:  Acute cholecystitis.    PROCEDURE:  Laparoscopic cholecystectomy.    ANESTHESIA:  General plus local.    SURGEON:  Iain Quezada M.D.    ASSISTANT:  Ava Pereira PA-C.  A physician assistant was medically necessary for her skills in suturing, cutting suture, exposure and camera management throughout the operation.    SPECIMENS:  Gallbladder and contents for routine handling.    COMPLICATIONS:  None.    INDICATIONS FOR PROCEDURE:  Ms. Tovar is a healthy 41-year-old female with no antecedent history of biliary tract disease who came to the ED with several days of intermittent upper abdominal pain, nausea and vomiting.  She was found to have leukocytosis and an ultrasound, which showed a thick-walled gallbladder with gallstones.  She had persistent right upper quadrant tenderness and a clinical Garcia sign on my exam this morning and, for this reason, I offered cholecystectomy today.  Risks of the operation were outlined in detail.  These include infection, bleeding, harm to adjacent structures, open conversion, retained stone, bile leak, common duct injury and chronic diarrhea. The patient verbalized understanding of all the above and consented to proceed.    FINDINGS:  The gallbladder was tensely distended with a pale bile. There were multiple stones within the lumen and diffuse edema of the gallbladder wall.  The critical view of safety was obtained prior to clipping and dividing the cystic duct.    DESCRIPTION OF PROCEDURE:  With the patient under excellent general anesthesia in supine position, the abdomen was prepped and draped out in the usual sterile surgical fashion.  A timeout was then performed confirming the patient, procedure to be done as well as drug allergies.  She did receive a dose of Ancef for infection prophylaxis prior to beginning our procedure.  We began by elevating the inferior umbilical  skin fold and incising it longitudinally with an 11 blade.  Electrocautery and blunt dissection were then carried out down to the level of the midline fascia, which was then grasped and elevated into view.  The fascia was incised sharply with a 15 blade.  Stay sutures were placed in the apices and the peritoneum was punctured bluntly with a Carmalt.  We introduced a Enrrique port through this defect, applied insufflation, placed the patient head up and rolled slightly to her left.  Three further 5 mm ports were placed under direct vision in the epigastrium and right upper quadrant.  Gallbladder was visualized in the right upper quadrant, found to be tensely distended.  We cannulated and aspirated approximately 40 mL of pale bile from it.  This allowed us to grasp and elevate the gallbladder into view.  We incised the serosa at what we initially thought was the infundibulum, but after sweeping this tissue downwards we discovered that the gallbladder was long and tortuous and coursed further down the way.  We bluntly dissected with some fat downwards to expose what was more evidently the infundibulum at this juncture, which was impacted with stones.  We were able to grasp and elevate it and incised the serosa on the inferior edge of the infundibulum with electrocautery and sweep these tissues downwards with a suction catheter.  We identified what appeared to be the presumptive cystic duct. The fat next to this was windowed with a Kitner.  This window was enlarged further with careful electrocautery.  At this juncture, the critical view of safety was obtained and confirmed.  We clipped the cystic duct as well as the artery, which we bluntly surrounded, clipped and divided just adjacent to the duct.  The gallbladder was then dissected free from the liver bed, encountering a posterior branch of the artery during the course of mobilization.  Once entirely freed, the specimen was placed in an EndoCatch pouch.  The right  upper quadrant was then copiously irrigated and venous bleeders on the liver bed were attended to with electrocautery.  Good hemostatic effect.      The upper ports were removed as was the Enrrique.  Insufflation was then released.  The specimen was delivered through the fascial defect in the pouch and passed off for routine pathology.  We closed the fascia with an 0 Vicryl stitch in a figure-of-eight fashion x 2.  The stay suture was then tied down over them and 30 mL of 0.5% Marcaine then distributed in all incisions.  Skin was closed with 4-0 Vicryls in deep dermal fashion.  Skin glue was applied atop the closed wounds.  The patient tolerated the procedure well.  She was extubated and brought to recovery in excellent condition.  All sharps and sponge counts were correct at the conclusion of the case.      Iain Briggs MD        D: 2021   T: 2021   MT: [email or web address]    Name:     STAR WRAY  MRN:      [id number]        Account:        [id number]   :      1980           Procedure Date: 2021     Document: [id number]    cc:  [facility]

## 2021-06-21 NOTE — INTERVAL H&P NOTE
I have reviewed the surgical (or preoperative) H&P that is linked to this encounter, and examined the patient. There are no significant changes   Rapid Mohs Report (Note: If The Tumor Is Complex, Or If Any Stage Is Divided Into More Than 5 Pieces Or If You Want A More Detailed Report, Select No And Proceed To The Individual Stages Below): yes

## 2021-06-21 NOTE — DISCHARGE INSTRUCTIONS
HOME CARE FOLLOWING LAPAROSCOPIC CHOLECYSTECTOMY  NOREEN Ham, KAMAR Ott R. O Donnell, J. Shaheen    INCISIONAL CARE:  Replace the bandage over your incisions DAILY until all drainage stops, or if more comfortable to have in place.  If present, leave the steri-strips (white paper tapes) in place for 14 days after surgery.  If Dermabond (a type of skin glue) is present, leave in place until it wears/flakes off (2-3 weeks).     BATHING:  OK to shower 48 hours after surgery.  Avoid baths for 1 week after surgery.  You may wash your hair at any time.  Gently pat your incision dry after bathing.  Do not apply lotions, creams, or ointments to incisions.    ACTIVITY:  Light Activity -- you may immediately be up and about as tolerated.  Walking is encouraged, increase as tolerated.  Driving/Light Work-- when comfortable and off narcotic pain medications.  Strenuous Work/Activity -- limit lifting to 20 pounds for 2 weeks.  Progressively increase with time.  Active Sports (running, biking, etc.) -- cautiously resume after 2 weeks.    DISCOMFORT:  Local anesthetic placed at surgery should provide relief for 4-8 hours.  Begin taking pain pills before discomfort is severe.  Take the pain medication with some food, when possible, to minimize side effects.  Intermittent use of ice packs may help during the first 1-3 weeks after surgery.  Expect gradual improvement.    Over-the-counter anti-inflammatory medications (i.e. Ibuprofen/Advil/Motrin or Naprosyn/Aleve) may be used per package instructions in addition to or while tapering off the narcotic pain medications to decrease swelling and sensitivity.  DO NOT TAKE these Anti-inflammatory medications if your primary physician has advised against doing so, or if you have acid reflux, ulcer, or bleeding disorder, or take blood-thinner medications.  Call your primary physician or the surgery office if you have medication questions.    After laparoscopic  cholecystectomy, you may have shoulder or upper back discomfort due to the gas used during surgery.  This is temporary and should resolve within 2-3 days.  Frequent short walks may help with this.  You may have decreased energy level for 1-2 weeks after surgery related to your recovery.    DIET:  Start with liquids and gradually increase diet as tolerated.  Drink plenty of fluids.  While taking pain medications, consider use of a stool softener, increase your fiber in your diet, or add a fiber supplement (like Metamucil, Citrucel) to help prevent constipation - a possible side effect of pain medications.  It is not uncommon to experience some bowel changes (loose stools or constipation) after surgery.  Your body has to adapt to you no longer having a gall bladder.  To help minimize this side effect, avoid fatty foods for 1-2 weeks after surgery.  You may then slowly increase the amount of fatty foods in your diet.      NAUSEA:  If nauseated from the anesthetic/pain meds; rest in bed, get up cautiously with assistance, and drink clear liquids (juice, tea, broth).    FOLLOW-UP AFTER SURGERY:  -Our office will contact you approximately 2-3 weeks after surgery to check on your progress and answer any questions you may have.  If you are doing well, you will not need to return for an office appointment.  If any concerns are identified over the phone, we will help you make an appointment to see a provider.    -If you have not received a phone call, have any questions or concerns, or would like to be seen, please call us at [phone].  We are located at: [address], [address]; [place] [address]    -CONTACT US IF THE FOLLOWING DEVELOPS:   1. A fever that is above 101     2. Increased redness, warmth, drainage, bleeding, or swelling.   3. Pain that is not relieved by rest/ice and your prescription.   4.  Increasing pain after 48 hours.   5. Drainage that is thick, cloudy, yellow, green or white.   6. Any other  questions or concerns.      FREQUENTLY ASKED QUESTIONS:    Q:  How should my incision look?    A:  Normally your incision will appear slightly swollen with light redness directly along the incision itself as it heals.  It may feel like a bump or ridge as the healing/scarring happens, and over time (3-4 months) this bump or ridge feeling should slowly go away.  In general, clear or pink watery drainage can be normal at first as your incision heals, but should decrease over time.    Q:  How do I know if my incision is infected?  A:  Look at your incision for signs of infection, like redness around the incision spreading to surrounding skin, or drainage of cloudy or foul-smelling drainage.  If you feel warm, check your temperature to see if you are running a fever.    **If any of these things occur, please notify the nurse at our office.  We may need you to come into the office for an incision check.      Q:  How do I take care of my incision?  A:  If you have a dressing in place - Starting the day after surgery, replace the dressing 1-2 times a day until there is no further drainage from the incision.  At that time, a dressing is no longer needed.  Try to minimize tape on the skin if irritation is occurring at the tape sites.  If you have significant irritation from tape on the skin, please call the office to discuss other method of dressing your incision.    Small pieces of tape called  steri-strips  may be present directly overlying your incision; these may be removed 10 days after surgery unless otherwise specified by your surgeon.  If these tapes start to loosen at the ends, you may trim them back until they fall off or are removed.    A:  If you had  Dermabond  tissue glue used as a dressing (this causes your incision to look shiny with a clear covering over it) - This type of dressing wears off with time and does not require more dressings over the top unless it is draining around the glue as it wears off.  Do  not apply ointments or lotions over the incisions until the glue has completely worn off.    Q:  There is a piece of tape or a sticky  lead  still on my skin.  Can I remove this?  A:  Sometimes the sticky  leads  used for monitoring during surgery or for evaluation in the emergency department are not all removed while you are in the hospital.  These sometimes have a tab or metal dot on them.  You can easily remove these on your own, like taking off a band-aid.  If there is a gel substance under the  lead , simply wipe/clean it off with a washcloth or paper towel.      Q:  What can I do to minimize constipation (very hard stools, or lack of stools)?  A:  Stay well hydrated.  Increase your dietary fiber intake or take a fiber supplement -with plenty of water.  Walk around frequently.  You may consider an over-the-counter stool-softener.  Your Pharmacist can assist you with choosing one that is stocked at your pharmacy.  Constipation is also one of the most common side effects of pain medication.  If you are using pain medication, be pro-active and try to PREVENT problems with constipation by taking the steps above BEFORE constipation becomes a problem.    Q:  What do I do if I need more pain medications?  A:  Call the office to receive refills.  Be aware that certain pain meds cannot be called into a pharmacy and actually require a paper prescription.  A change may be made in your pain med as you progress thru your recovery period or if you have side effects to certain meds.    --Pain meds are NOT refilled after 5pm on weekdays, and NOT AT ALL on the weekends, so please look ahead to prevent problems.      Q:  Why am I having a hard time sleeping now that I am at home?  A:  Many medications you receive while you are in the hospital can impact your sleep for a number of days after your surgery/hospitalization.  Decreased level of activity and naps during the day may also make sleeping at night difficult.  Try to  minimize day-time naps, and get up frequently during the day to walk around your home during your recovery time.  Sleep aides may be of some help, but are not recommended for long-term use.      Q:  I am having some back discomfort.  What should I do?  A:  This may be related to certain positioning that was required for your surgery, extended periods of time in bed, or other changes in your overall activity level.  You may try ice, heat, acetaminophen, or ibuprofen to treat this temporarily.  Note that many pain medications have acetaminophen in them and would state this on the prescription bottle.  Be sure not to exceed the maximum of 4000mg per day of acetaminophen.     **If the pain you are having does not resolve, is severe, or is a flare of back pain you have had on other occasions prior to surgery, please contact your primary physician for further recommendations or for an appointment to be examined at their office.    Q:  Why am I having headaches?  A:  Headaches can be caused by many things:  caffeine withdrawal, use of pain meds, dehydration, high blood pressure, lack of sleep, over-activity/exhaustion, flare-up of usual migraine headaches.  If you feel this is related to muscle tension (a band-like feeling around the head, or a pressure at the low-back of the head) you may try ice or heat to this area.  You may need to drink more fluids (try electrolyte drink like Gatorade), rest, or take your usual migraine medications.   **If your headaches do not resolve, worsen, are accompanied by other symptoms, or if your blood pressure is high, please call your primary physician for recommendation and/or examination.    Q:  I am unable to urinate.  What do I do?  A:  A small percentage of people can have difficulty urinating initially after surgery.  This includes being able to urinate only a very small amount at a time and feeling discomfort or pressure in the very low abdomen.  This is called  urinary retention ,  and is actually an urgent situation.  Proceed to your nearest Emergency department for evaluation (not an Urgent Care Center).  Sometimes the bladder does not work correctly after certain medications you receive during surgery, or related to certain procedures.  You may need to have a catheter placed until your bladder recovers.  When planning to go to an Emergency department, it may help to call the ER to let them know you are coming in for this problem after a surgery.  This may help you get in quicker to be evaluated.  **If you have symptoms of a urinary tract infection, please contact your primary physician for the proper evaluation and treatment.          If you have other questions, please call the office Monday thru Friday between 8am and 5pm to discuss with the nurse or physician assistant.  #[phone]    There is a surgeon ON CALL on weekday evenings and over the weekend in case of urgent need only, and may be contacted at the same number.    If you are having an emergency, call 911 or proceed to your nearest emergency department.      GENERAL ANESTHESIA OR SEDATION ADULT DISCHARGE INSTRUCTIONS   SPECIAL PRECAUTIONS FOR 24 HOURS AFTER SURGERY    IT IS NOT UNUSUAL TO FEEL LIGHT-HEADED OR FAINT, UP TO 24 HOURS AFTER SURGERY OR WHILE TAKING PAIN MEDICATION.  IF YOU HAVE THESE SYMPTOMS; SIT FOR A FEW MINUTES BEFORE STANDING AND HAVE SOMEONE ASSIST YOU WHEN YOU GET UP TO WALK OR USE THE BATHROOM.    YOU SHOULD REST AND RELAX FOR THE NEXT 24 HOURS AND YOU MUST MAKE ARRANGEMENTS TO HAVE SOMEONE STAY WITH YOU FOR AT LEAST 24 HOURS AFTER YOUR DISCHARGE.  AVOID HAZARDOUS AND STRENUOUS ACTIVITIES.  DO NOT MAKE IMPORTANT DECISIONS FOR 24 HOURS.    DO NOT DRIVE ANY VEHICLE OR OPERATE MECHANICAL EQUIPMENT FOR 24 HOURS FOLLOWING THE END OF YOUR SURGERY.  EVEN THOUGH YOU MAY FEEL NORMAL, YOUR REACTIONS MAY BE AFFECTED BY THE MEDICATION YOU HAVE RECEIVED.    DO NOT DRINK ALCOHOLIC BEVERAGES FOR 24 HOURS FOLLOWING YOUR  SURGERY.    DRINK CLEAR LIQUIDS (APPLE JUICE, GINGER ALE, 7-UP, BROTH, ETC.).  PROGRESS TO YOUR REGULAR DIET AS YOU FEEL ABLE.    YOU MAY HAVE A DRY MOUTH, A SORE THROAT, MUSCLES ACHES OR TROUBLE SLEEPING.  THESE SHOULD GO AWAY AFTER 24 HOURS.    CALL YOUR DOCTOR FOR ANY OF THE FOLLOWING:  SIGNS OF INFECTION (FEVER, GROWING TENDERNESS AT THE SURGERY SITE, A LARGE AMOUNT OF DRAINAGE OR BLEEDING, SEVERE PAIN, FOUL-SMELLING DRAINAGE, REDNESS OR SWELLING.    IT HAS BEEN OVER 8 TO 10 HOURS SINCE SURGERY AND YOU ARE STILL NOT ABLE TO URINATE (PASS WATER).       Medications:  Maximum acetaminophen (Tylenol) dose from all sources should not exceed 4 grams (4000 mg) per day.  Each Norco contains 325mg of Tylenol.  Transdermal Scop (Scopolamine) Patch    The Transdermal Scop patch placed behind your ear helps to prevent nausea and vomiting associated with the use of anesthesia and certain analgesics used during or after many types surgery.  You will need to remove this patch after 3 days.  However, it may be removed sooner if you are no longer concerned about nausea or vomiting.       The most common side effects:  ?  dryness of the mouth  ?  drowsiness  ?  blurred vision  ?  dilation of pupils  ?  disorientation, memory disturbances and/or confusion  ?  dizziness  ?  restlessness  ?  hallucinations  ?  difficulty urinating  ?  skin rash  ?  red or painful eyes    Avoid drinking alcohol while using Transderm Scop patch.  Be careful about driving or operating any machinery while using this medication since it may make you drowsy.  In the unlikely event that you experience pain in the eye, which may be accompanied by widening of the pupil, eye redness and blurred vision, remove the Transderm Scop Patch immediately and consult your doctor.    Removal of Transderm Scop Patch:  To remove the patch simply peel it off your skin.  Be sure to wash your hands and the area behind your ear thoroughly with soap and water.  The Transderm  Scop Patch will continue to have some active ingredient after use.  Therefore, to avoid accidental contact or ingestion by children or pets, fold the used patch in half with the sticky side together and dispose in the trash out of reach of children and pets.  If you wear contact lens, be sure to wash your hands first before handling contact lens.      You can remove the patch at any time, but no later than 6/24 at 3pm.

## 2021-06-21 NOTE — ANESTHESIA POSTPROCEDURE EVALUATION
Patient: Sierra Tovar    Procedure(s):  CHOLECYSTECTOMY, LAPAROSCOPIC    Diagnosis:Acute cholecystitis [K81.0]  Diagnosis Additional Information: No value filed.    Anesthesia Type:  General    Note:  Disposition: Outpatient   Postop Pain Control: Uneventful            Sign Out: Well controlled pain   PONV: No   Neuro/Psych: Uneventful            Sign Out: Acceptable/Baseline neuro status   Airway/Respiratory: Uneventful            Sign Out: Acceptable/Baseline resp. status   CV/Hemodynamics: Uneventful            Sign Out: Acceptable CV status; No obvious hypovolemia; No obvious fluid overload   Other NRE: NONE   DID A NON-ROUTINE EVENT OCCUR? No           Last vitals:  Vitals:    06/21/21 1442 06/21/21 1545 06/21/21 1618   BP: 122/77 120/80 125/81   Pulse: 75     Resp:  16 15   Temp:   98.6  F (37  C)   SpO2: 91%  95%       Last vitals prior to Anesthesia Care Transfer:  CRNA VITALS  6/21/2021 1207 - 6/21/2021 1307      6/21/2021             Pulse:  87    SpO2:  95 %    Resp Rate (observed):  (!) 5          Electronically Signed By: John Christianson MD  June 21, 2021  5:31 PM

## 2021-06-21 NOTE — PLAN OF CARE
"PRIMARY DIAGNOSIS: ACUTE CHOLECYSTITIS        OUTPATIENT/OBSERVATION GOALS TO BE MET BEFORE DISCHARGE:     1. Pain status: Improved but still requiring IV narcotics.  2. Stable vital signs and labs (if performed) at disposition:   3. Tolerating adequate PO diet:NPO   4. Successful cholecystectomy or clear follow up plan with General Surgery team if immediate surgery not performed.  Plan is for surgery later this afternoon  5. ADLs back to baseline?  No, but independent at this time in. Informed her to call after receiving   6. Activity and level of assistance: Up with standby assistance.  7. Barriers to discharge noted.  No unless is not postoperatively stable.   BP (!) 163/83 (BP Location: Left arm)   Pulse 63   Temp 98  F (36.7  C) (Oral)   Resp 16   Ht 1.6 m (5' 3\")   Wt 103.6 kg (228 lb 4.8 oz)   SpO2 94%   BMI 40.44 kg/m              C/o right upper quadrent abdominal pain, improved after Dilaudid IV.   Patient up with standby assist, ambulating to the bathroom prn.  Plan:  Continue to provide supportive cares. NPO after 2400 for probable surgery tomorrow.  Pain control.       Discharge Planner Nurse   Safe discharge environment identified: Yes  Barriers to discharge: Yes, unless medically cleared          Entered by: Agnes Collazo RN on 6/21/2021 at 0500  Please review provider order for any additional goals.   Nurse to notify provider when observation goals have been met and patient is ready for discharge.               "

## 2021-06-22 LAB
COPATH REPORT: NORMAL
INTERPRETATION ECG - MUSE: NORMAL

## 2021-06-24 ENCOUNTER — TELEPHONE (OUTPATIENT)
Dept: SURGERY | Facility: CLINIC | Age: 41
End: 2021-06-24

## 2021-06-24 DIAGNOSIS — K81.0 ACUTE CHOLECYSTITIS: ICD-10-CM

## 2021-06-24 RX ORDER — HYDROCODONE BITARTRATE AND ACETAMINOPHEN 5; 325 MG/1; MG/1
1-2 TABLET ORAL EVERY 4 HOURS PRN
Qty: 10 TABLET | Refills: 0 | Status: SHIPPED | OUTPATIENT
Start: 2021-06-24

## 2021-06-24 NOTE — TELEPHONE ENCOUNTER
Name of caller: Patient    Reason for Call:  Patient would like a refill on pain medication Norco.    Surgeon:  Dr. Quezada    Recent Surgery:  Yes.    If yes, when & what type:  Laparoscopic cholecystectomy 6/21/21       Best phone number to reach pt at is: [phone]  Ok to leave a message with medical info? Yes.    Pharmacy preferred (if calling for a refill): N/A

## 2021-06-24 NOTE — TELEPHONE ENCOUNTER
Procedure:  Laparoscopic cholecystectomy    Date:  06/21/2021    Surgeon:  Damien    Patient requesting more pain medication.  Given #10 at discharge. Alternating with ibuprofen.    Utilizing ice.    Urinating okay and having bowel movements (diarrhea).    No other concerns.      Pain is at location of gallbladder.    No s/s of infection.    Wondering if okay to receive 2nd dose covid vaccine this weekend.    Informed her that it is okay to receive vaccine.    Encouraged her to call with any further questions or concerns.    She agrees.    Bianca Valderrama, RN-BSN    Preferred Pharmacy:  [facility] in [place]

## 2021-07-07 ENCOUNTER — TELEPHONE (OUTPATIENT)
Dept: SURGERY | Facility: CLINIC | Age: 41
End: 2021-07-07

## 2021-07-07 NOTE — TELEPHONE ENCOUNTER
Attempted to call patient for post op check.  No answer.  Message was left for patient to call back if they had any questions of concerns.     Ava Pereira PA-C

## 2021-07-16 ENCOUNTER — TELEPHONE (OUTPATIENT)
Dept: SURGERY | Facility: CLINIC | Age: 41
End: 2021-07-16

## 2021-07-16 NOTE — TELEPHONE ENCOUNTER
S/p karen mike   Date: 6/21/21  Surgeon: Dr. Quezada    Patient calling with post-op questions.      States she is doing well post-op.  Incisions appear to be healing well, with no redness, swelling or drainage.  Some of the skin glue remains in place.      We discussed that she is ok to gently peel away any remaining skin glue and ok to use lotions.  She can submerge incision and ok to gradually resume lifting and cautiously resume active sports.      Pt verbalized understanding and is appreciative of info.

## (undated) DEVICE — GLOVE PROTEXIS POWDER FREE 7.5 ORTHOPEDIC 2D73ET75

## (undated) DEVICE — LINEN TOWEL PACK X10 5473

## (undated) DEVICE — SUCTION CANISTER MEDIVAC LINER 3000ML W/LID 65651-530

## (undated) DEVICE — ENDO TROCAR BLUNT TIP KII BALLOON 12X100MM C0R47

## (undated) DEVICE — ENDO TROCAR SLEEVE KII Z-THREADED 05X100MM CTS02

## (undated) DEVICE — SUCTION IRR STRYKERFLOW II W/TIP 250-070-520

## (undated) DEVICE — ADH SKIN CLOSURE PREMIERPRO EXOFIN MICOR HV 0.5ML 3471

## (undated) DEVICE — LINEN FULL SHEET 5511

## (undated) DEVICE — CLIP APPLIER ENDO 5MM M/L LIGAMAX EL5ML

## (undated) DEVICE — SOL NACL 0.9% IRRIG 1000ML BOTTLE 2F7124

## (undated) DEVICE — SOL NACL 0.9% IRRIG 3000ML BAG 2B7477

## (undated) DEVICE — Device

## (undated) DEVICE — LINEN POUCH DBL 5427

## (undated) DEVICE — BLADE KNIFE SURG 11 371111

## (undated) DEVICE — ENDO TROCAR FIRST ENTRY KII FIOS ADV FIX 05X100MM CFF03

## (undated) DEVICE — ESU GROUND PAD ADULT W/CORD E7507

## (undated) DEVICE — ENDO POUCH UNIV RETRIEVAL SYSTEM INZII 10MM CD001

## (undated) DEVICE — GLOVE PROTEXIS BLUE W/NEU-THERA 8.0  2D73EB80

## (undated) DEVICE — BAG CLEAR TRASH 1.3M 39X33" P4040C

## (undated) DEVICE — SU VICRYL 0 UR-6 27" J603H

## (undated) DEVICE — PREP CHLORAPREP 26ML TINTED HI-LITE ORANGE 930815

## (undated) DEVICE — SU VICRYL 4-0 PS-2 18" UND J496H

## (undated) DEVICE — LINEN HALF SHEET 5512

## (undated) DEVICE — ESU CORD MONOPOLAR 10'  E0510

## (undated) DEVICE — ENDO SPONGE ENDOSTICK KITTNER 5MM CHERRY 37002010

## (undated) RX ORDER — LIDOCAINE HYDROCHLORIDE 10 MG/ML
INJECTION, SOLUTION EPIDURAL; INFILTRATION; INTRACAUDAL; PERINEURAL
Status: DISPENSED
Start: 2021-06-21

## (undated) RX ORDER — FENTANYL CITRATE 50 UG/ML
INJECTION, SOLUTION INTRAMUSCULAR; INTRAVENOUS
Status: DISPENSED
Start: 2021-06-21

## (undated) RX ORDER — CEFAZOLIN SODIUM 2 G/100ML
INJECTION, SOLUTION INTRAVENOUS
Status: DISPENSED
Start: 2021-06-21

## (undated) RX ORDER — BUPIVACAINE HYDROCHLORIDE 5 MG/ML
INJECTION, SOLUTION EPIDURAL; INTRACAUDAL
Status: DISPENSED
Start: 2021-06-21

## (undated) RX ORDER — GLYCOPYRROLATE 0.2 MG/ML
INJECTION INTRAMUSCULAR; INTRAVENOUS
Status: DISPENSED
Start: 2021-06-21

## (undated) RX ORDER — SCOLOPAMINE TRANSDERMAL SYSTEM 1 MG/1
PATCH, EXTENDED RELEASE TRANSDERMAL
Status: DISPENSED
Start: 2021-06-21

## (undated) RX ORDER — DEXAMETHASONE SODIUM PHOSPHATE 4 MG/ML
INJECTION, SOLUTION INTRA-ARTICULAR; INTRALESIONAL; INTRAMUSCULAR; INTRAVENOUS; SOFT TISSUE
Status: DISPENSED
Start: 2021-06-21

## (undated) RX ORDER — PROPOFOL 10 MG/ML
INJECTION, EMULSION INTRAVENOUS
Status: DISPENSED
Start: 2021-06-21

## (undated) RX ORDER — ONDANSETRON 2 MG/ML
INJECTION INTRAMUSCULAR; INTRAVENOUS
Status: DISPENSED
Start: 2021-06-21

## (undated) RX ORDER — KETOROLAC TROMETHAMINE 30 MG/ML
INJECTION, SOLUTION INTRAMUSCULAR; INTRAVENOUS
Status: DISPENSED
Start: 2021-06-21

## (undated) RX ORDER — FENTANYL CITRATE-0.9 % NACL/PF 10 MCG/ML
PLASTIC BAG, INJECTION (ML) INTRAVENOUS
Status: DISPENSED
Start: 2021-06-21

## (undated) RX ORDER — METOPROLOL TARTRATE 1 MG/ML
INJECTION, SOLUTION INTRAVENOUS
Status: DISPENSED
Start: 2021-06-21